# Patient Record
Sex: FEMALE | Race: WHITE | NOT HISPANIC OR LATINO | Employment: UNEMPLOYED | URBAN - NONMETROPOLITAN AREA
[De-identification: names, ages, dates, MRNs, and addresses within clinical notes are randomized per-mention and may not be internally consistent; named-entity substitution may affect disease eponyms.]

---
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
## 2019-06-16 ENCOUNTER — APPOINTMENT (OUTPATIENT)
Dept: GENERAL RADIOLOGY | Facility: HOSPITAL | Age: 19
End: 2019-06-16

## 2019-06-16 ENCOUNTER — HOSPITAL ENCOUNTER (EMERGENCY)
Facility: HOSPITAL | Age: 19
Discharge: HOME | End: 2019-06-16
Attending: FAMILY MEDICINE | Admitting: FAMILY MEDICINE

## 2019-06-16 VITALS
HEART RATE: 105 BPM | DIASTOLIC BLOOD PRESSURE: 97 MMHG | RESPIRATION RATE: 16 BRPM | OXYGEN SATURATION: 99 % | TEMPERATURE: 97.8 F | SYSTOLIC BLOOD PRESSURE: 153 MMHG

## 2019-06-16 DIAGNOSIS — R09.1 PLEURISY: Primary | ICD-10-CM

## 2019-06-16 PROCEDURE — 99282 EMERGENCY DEPT VISIT SF MDM: CPT

## 2019-06-16 PROCEDURE — 71046 X-RAY EXAM CHEST 2 VIEWS: CPT

## 2019-06-16 RX ORDER — PREDNISONE 20 MG/1
20 TABLET ORAL DAILY
Qty: 5 TABLET | Refills: 0 | OUTPATIENT
Start: 2019-06-16 | End: 2021-07-11

## 2021-07-11 ENCOUNTER — APPOINTMENT (OUTPATIENT)
Dept: GENERAL RADIOLOGY | Facility: HOSPITAL | Age: 21
End: 2021-07-11

## 2021-07-11 ENCOUNTER — HOSPITAL ENCOUNTER (EMERGENCY)
Facility: HOSPITAL | Age: 21
Discharge: HOME | End: 2021-07-11
Attending: EMERGENCY MEDICINE | Admitting: EMERGENCY MEDICINE

## 2021-07-11 VITALS
HEART RATE: 96 BPM | SYSTOLIC BLOOD PRESSURE: 172 MMHG | TEMPERATURE: 98.4 F | WEIGHT: 285 LBS | DIASTOLIC BLOOD PRESSURE: 72 MMHG | BODY MASS INDEX: 44.73 KG/M2 | RESPIRATION RATE: 18 BRPM | OXYGEN SATURATION: 96 % | HEIGHT: 67 IN

## 2021-07-11 DIAGNOSIS — S50.12XA CONTUSION OF LEFT FOREARM, INITIAL ENCOUNTER: ICD-10-CM
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
## 2021-07-11 PROCEDURE — 73110 X-RAY EXAM OF WRIST: CPT

## 2021-07-11 PROCEDURE — 99282 EMERGENCY DEPT VISIT SF MDM: CPT

## 2021-07-11 PROCEDURE — 73090 X-RAY EXAM OF FOREARM: CPT

## 2021-07-11 NOTE — ED PROVIDER NOTES
Subjective   20-year-old female presents emergency department with left arm pain after a fall.  Reports she tripped on some boxes and fell landing with her left arm twisted up under her.  Complains of posterior forearm pain.  Denies any other injury, hitting her head or loss of consciousness.  Reports a couple of lacerations to the palmar aspect of her hand which have been cleaned and bandaged by her significant other and she declines evaluation of those at this time.    Family history, surgical history, social history, current medications and allergies are reviewed with the patient and triage documentation and vitals are reviewed.      History provided by:  Patient   used: No        Review of Systems   Musculoskeletal: Positive for arthralgias and joint swelling. Negative for back pain and neck pain.   Skin: Positive for wound.   All other systems reviewed and are negative.      Past Medical History:   Diagnosis Date   • Anxiety and depression    • Depression    • Diabetes (CMS/HCC)    • Disease of thyroid gland     HYPOTHYROID   • Eczema    • GERD (gastroesophageal reflux disease)    • Migraine    • Migraines    • Throat infection     frequent   • Wears glasses        Allergies   Allergen Reactions   • Keflex [Cephalexin] Hives       Past Surgical History:   Procedure Laterality Date   • EAR TUBES Bilateral     AT THE AGE OF 6   • TONSILLECTOMY N/A 6/20/2018    Procedure: TONSILLECTOMY POSSIBLE ADENOIDECTOMY;  Surgeon: Yusuf Espinoza MD;  Location: [facility];  Service: ENT       Family History   Problem Relation Age of Onset   • Hypertension Mother    • Arthritis Mother    • Hypertension Father    • Arthritis Father        Social History     Socioeconomic History   • Marital status: Single     Spouse name: Not on file   • Number of children: Not on file   • Years of education: Not on file   • Highest education level: Not on file   Tobacco Use   • Smoking status: Never Smoker   •  Smokeless tobacco: Never Used   Substance and Sexual Activity   • Alcohol use: No   • Drug use: No   • Sexual activity: Defer           Objective   Physical Exam  Vitals and nursing note reviewed.   Constitutional:       General: She is not in acute distress.     Appearance: Normal appearance. She is obese. She is not ill-appearing, toxic-appearing or diaphoretic.   HENT:      Head: Normocephalic and atraumatic.   Cardiovascular:      Rate and Rhythm: Normal rate and regular rhythm.      Pulses: Normal pulses.      Heart sounds: No murmur heard.     Pulmonary:      Effort: Pulmonary effort is normal.      Breath sounds: Normal breath sounds.   Musculoskeletal:      Left forearm: Swelling and tenderness present. No deformity, lacerations or bony tenderness.      Cervical back: Normal range of motion. No tenderness.   Skin:     General: Skin is warm and dry.      Capillary Refill: Capillary refill takes less than 2 seconds.      Findings: Bruising present.   Neurological:      General: No focal deficit present.      Mental Status: She is alert and oriented to person, place, and time.         Procedures  none         ED Course    Labs Reviewed - No data to display  XR Forearm 2 View Left    Result Date: 7/11/2021  Narrative: XR FOREARM 2 VIEWS INDICATION: 20 years Female; fall, pain Technique: 2 views of the left forearm. COMPARISON: None. FINDINGS: Bones: No acute fracture or subluxation or significant degenerative changes. Soft tissues: Unremarkable. Incidental findings: None.     Impression: NO ACUTE FRACTURE OR SUBLUXATION. Electronically signed by:  Lexus Fisher  7/11/2021 2:45 AM CDT Workstation: [id number]    XR Wrist 3+ View Left    Result Date: 7/11/2021  Narrative: XR WRIST 3 OR MORE VIEWS INDICATION: 20 years Female; fall, pain Technique: 4 views of the left wrist. COMPARISON: 10/22/2015. FINDINGS: Bones: No acute fracture or subluxation or significant degenerative changes. Mild ulnar negative variance. Soft  tissues: Unremarkable. Incidental findings: None.     Impression: NO ACUTE FRACTURE OR SUBLUXATION. Electronically signed by:  Lexus Fisehr  7/11/2021 2:48 AM CDT Workstation: [id number]          MDM  Number of Diagnoses or Management Options     Amount and/or Complexity of Data Reviewed  Tests in the radiology section of CPT®: reviewed    Patient Progress  Patient progress: stable    X-ray of the forearm and wrist revealed no acute osseous abnormality.  Patient advised on symptomatic treatment of contusions and agreeable to discharge.    Final diagnoses:   Accidental fall, initial encounter   Contusion of left forearm, initial encounter       ED Disposition  ED Disposition     ED Disposition Condition Comment    Discharge Stable           Dorian Decker MD  [address] [place] [address]  [phone]               Medication List      Stop    busPIRone 10 MG tablet  Commonly known as: BUSPAR     citalopram 20 MG tablet  Commonly known as: CeleXA     Cool Mist Humidifier misc     HYDROcodone-acetaminophen 7.5-325 MG/15ML solution  Commonly known as: HYCET     omeprazole 20 MG capsule  Commonly known as: priLOSEC     ondansetron ODT 4 MG disintegrating tablet  Commonly known as: Zofran ODT     predniSONE 20 MG tablet  Commonly known as: Avel Craig DO  07/11/21 0645

## 2021-07-11 NOTE — DISCHARGE INSTRUCTIONS
Please return with new or worsening symptoms.  Follow-up with primary care as needed.  Rest, ice, compress and elevate to help with discomfort and swelling.

## 2022-07-29 ENCOUNTER — HOSPITAL ENCOUNTER (EMERGENCY)
Facility: HOSPITAL | Age: 22
Discharge: HOME | End: 2022-07-30
Attending: EMERGENCY MEDICINE | Admitting: EMERGENCY MEDICINE

## 2022-07-29 ENCOUNTER — APPOINTMENT (OUTPATIENT)
Dept: GENERAL RADIOLOGY | Facility: HOSPITAL | Age: 22
End: 2022-07-29

## 2022-07-29 DIAGNOSIS — S60.031A CONTUSION OF RIGHT MIDDLE FINGER WITHOUT DAMAGE TO NAIL, INITIAL ENCOUNTER: Primary | ICD-10-CM

## 2022-07-29 PROCEDURE — 36415 COLL VENOUS BLD VENIPUNCTURE: CPT

## 2022-07-29 PROCEDURE — 99283 EMERGENCY DEPT VISIT LOW MDM: CPT

## 2022-07-29 PROCEDURE — 73140 X-RAY EXAM OF FINGER(S): CPT

## 2022-07-29 RX ORDER — IBUPROFEN 400 MG/1
400 TABLET ORAL ONCE
Status: COMPLETED | OUTPATIENT
Start: 2022-07-29 | End: 2022-07-29

## 2022-07-29 RX ORDER — HYDROCODONE BITARTRATE AND ACETAMINOPHEN 7.5; 325 MG/1; MG/1
1 TABLET ORAL ONCE
Status: COMPLETED | OUTPATIENT
Start: 2022-07-29 | End: 2022-07-29

## 2022-07-29 RX ADMIN — SODIUM CHLORIDE 1000 ML: 9 INJECTION, SOLUTION INTRAVENOUS at 23:40

## 2022-07-29 RX ADMIN — HYDROCODONE BITARTRATE AND ACETAMINOPHEN 1 TABLET: 7.5; 325 TABLET ORAL at 23:40

## 2022-07-29 RX ADMIN — IBUPROFEN 400 MG: 400 TABLET, FILM COATED ORAL at 23:40

## 2022-07-30 VITALS
OXYGEN SATURATION: 97 % | HEIGHT: 67 IN | DIASTOLIC BLOOD PRESSURE: 63 MMHG | TEMPERATURE: 98.6 F | SYSTOLIC BLOOD PRESSURE: 141 MMHG | HEART RATE: 100 BPM | BODY MASS INDEX: 43.95 KG/M2 | RESPIRATION RATE: 18 BRPM | WEIGHT: 280 LBS

## 2022-07-30 RX ORDER — IBUPROFEN 600 MG/1
600 TABLET ORAL 3 TIMES DAILY
Qty: 15 TABLET | Refills: 0 | Status: SHIPPED | OUTPATIENT
Start: 2022-07-30

## 2023-03-31 ENCOUNTER — OFFICE VISIT (OUTPATIENT)
Dept: GASTROENTEROLOGY | Facility: CLINIC | Age: 23
End: 2023-03-31
Payer: MEDICAID

## 2023-03-31 ENCOUNTER — LAB (OUTPATIENT)
Dept: LAB | Facility: HOSPITAL | Age: 23
End: 2023-03-31
Payer: MEDICAID

## 2023-03-31 VITALS
SYSTOLIC BLOOD PRESSURE: 137 MMHG | HEART RATE: 107 BPM | DIASTOLIC BLOOD PRESSURE: 81 MMHG | WEIGHT: 293 LBS | HEIGHT: 67 IN | BODY MASS INDEX: 45.99 KG/M2

## 2023-03-31 DIAGNOSIS — K59.09 OTHER CONSTIPATION: ICD-10-CM

## 2023-03-31 DIAGNOSIS — R19.7 DIARRHEA, UNSPECIFIED TYPE: ICD-10-CM

## 2023-03-31 DIAGNOSIS — R10.10 PAIN OF UPPER ABDOMEN: Primary | ICD-10-CM

## 2023-03-31 DIAGNOSIS — R11.0 NAUSEA: ICD-10-CM

## 2023-03-31 LAB
ALBUMIN SERPL-MCNC: 4.6 G/DL (ref 3.5–5.2)
ALP SERPL-CCNC: 69 U/L (ref 39–117)
ALT SERPL W P-5'-P-CCNC: 73 U/L (ref 1–33)
AST SERPL-CCNC: 50 U/L (ref 1–32)
BILIRUB CONJ SERPL-MCNC: <0.2 MG/DL (ref 0–0.3)
BILIRUB INDIRECT SERPL-MCNC: ABNORMAL MG/DL
BILIRUB SERPL-MCNC: 0.4 MG/DL (ref 0–1.2)
PROT SERPL-MCNC: 8 G/DL (ref 6–8.5)

## 2023-03-31 PROCEDURE — 80076 HEPATIC FUNCTION PANEL: CPT | Performed by: INTERNAL MEDICINE

## 2023-03-31 PROCEDURE — 36415 COLL VENOUS BLD VENIPUNCTURE: CPT | Performed by: INTERNAL MEDICINE

## 2023-03-31 RX ORDER — HYDROXYZINE 50 MG/1
50 TABLET, FILM COATED ORAL 3 TIMES DAILY
COMMUNITY
Start: 2021-05-04

## 2023-03-31 RX ORDER — CLONAZEPAM 0.5 MG/1
0.5 TABLET ORAL 3 TIMES DAILY
COMMUNITY
Start: 2022-04-29

## 2023-03-31 RX ORDER — LURASIDONE HYDROCHLORIDE 60 MG/1
60 TABLET, FILM COATED ORAL NIGHTLY
COMMUNITY
Start: 2023-03-10

## 2023-03-31 RX ORDER — SODIUM CHLORIDE 9 MG/ML
40 INJECTION, SOLUTION INTRAVENOUS AS NEEDED
OUTPATIENT
Start: 2023-03-31

## 2023-03-31 RX ORDER — DICYCLOMINE HCL 20 MG
20 TABLET ORAL EVERY 6 HOURS
Qty: 120 TABLET | Refills: 5 | Status: SHIPPED | OUTPATIENT
Start: 2023-03-31 | End: 2023-04-30

## 2023-03-31 RX ORDER — OMEPRAZOLE 40 MG/1
40 CAPSULE, DELAYED RELEASE ORAL DAILY
Qty: 30 CAPSULE | Refills: 11 | Status: SHIPPED | OUTPATIENT
Start: 2023-03-31

## 2023-03-31 RX ORDER — INSULIN HUMAN 500 [IU]/ML
110 INJECTION, SOLUTION SUBCUTANEOUS 4 TIMES DAILY
COMMUNITY
Start: 2023-03-29

## 2023-03-31 RX ORDER — DEXTROSE AND SODIUM CHLORIDE 5; .45 G/100ML; G/100ML
30 INJECTION, SOLUTION INTRAVENOUS CONTINUOUS PRN
OUTPATIENT
Start: 2023-03-31

## 2023-03-31 RX ORDER — LISINOPRIL 10 MG/1
10 TABLET ORAL DAILY
COMMUNITY
Start: 2023-03-18

## 2023-03-31 RX ORDER — DULAGLUTIDE 3 MG/.5ML
3 INJECTION, SOLUTION SUBCUTANEOUS WEEKLY
COMMUNITY
Start: 2022-04-29 | End: 2023-05-05

## 2023-04-18 NOTE — PROGRESS NOTES
[facility]      Chief Complaint:   Chief Complaint   Patient presents with   • hepatic statosis   • Abdominal Pain   • Constipation   • Diarrhea       Subjective     HPI:   Ms. Briceño is a 22-year-old  female with past medical history of anxiety, depression, diabetes mellitus, thyroid disorder, eczema, migraine, GERD presenting for evaluation for epigastric pain with nausea.  She has intermittent bouts of epigastric pain with nausea and eructations for past several years.  She also has diarrhea alternating with constipation for past several months.  Denied rectal bleeding or weight loss.  She describes her eructations as sulfur burps.  She was seen by Dr. Pierce in the past.  Currently switching care to [place] as she moved here.  She has history of fatty liver disease.  She denied NSAID usage.    Past Medical History:   Past Medical History:   Diagnosis Date   • Anxiety and depression    • Depression    • Diabetes    • Disease of thyroid gland     HYPOTHYROID   • Eczema    • GERD (gastroesophageal reflux disease)    • Migraine    • Migraines    • Throat infection     frequent   • Wears glasses        Past Surgical History:  Past Surgical History:   Procedure Laterality Date   • EAR TUBES Bilateral     AT THE AGE OF 6   • TONSILLECTOMY N/A 6/20/2018    Procedure: TONSILLECTOMY POSSIBLE ADENOIDECTOMY;  Surgeon: Yusuf Espinoza MD;  Location: [facility];  Service: ENT       Family History:  Family History   Problem Relation Age of Onset   • Hypertension Mother    • Arthritis Mother    • Hypertension Father    • Arthritis Father        Social History:   reports that she has never smoked. She has never used smokeless tobacco. She reports that she does not drink alcohol and does not use drugs.    Medications:   Prior to Admission medications    Medication Sig Start Date End Date Taking? Authorizing Provider   clonazePAM (KlonoPIN) 0.5 MG tablet Take 1 tablet by mouth 3  (Three) Times a Day. 4/29/22  Yes Yamileth Zhang MD   Dulaglutide (Trulicity) 3 MG/0.5ML solution pen-injector Inject 3 mg under the skin into the appropriate area as directed 1 (One) Time Per Week. 4/29/22 5/5/23 Yes Yamileth Zhang MD   empagliflozin (JARDIANCE) 25 MG tablet tablet Take 1 tablet by mouth Daily. 3/2/23  Yes Yamileth Zhang MD   fluticasone (VERAMYST) 27.5 MCG/SPRAY nasal spray 2 sprays into the nostril(s) as directed by provider Daily.   Yes Yamileth Zhang MD   HumuLIN R U-500 KwikPen 500 UNIT/ML solution pen-injector CONCENTRATED injection Inject 110 Units under the skin into the appropriate area as directed 4 (Four) Times a Day. 3/29/23  Yes Yamileth Zhang MD   hydrOXYzine (ATARAX) 50 MG tablet Take 1 tablet by mouth 3 (Three) Times a Day. 5/4/21  Yes Yamileth Zhang MD   ibuprofen (ADVIL,MOTRIN) 600 MG tablet Take 1 tablet by mouth 3 (Three) Times a Day. 7/30/22  Yes Eric Arenas,    insulin regular (HumuLIN R,NovoLIN R) 100 UNIT/ML injection Inject 1 Units under the skin into the appropriate area as directed 3 (Three) Times a Day Before Meals. Takes 120 units in am; take 80 units for lunch and supper   Yes ProviderYamileth MD   Latuda 60 MG tablet tablet Take 1 tablet by mouth Every Night. 3/10/23  Yes Yamileth Zhang MD   levothyroxine (SYNTHROID, LEVOTHROID) 25 MCG tablet Take 1 tablet by mouth Daily. No med changes after last level check   Yes Yamileth Zhang MD   lisinopril (PRINIVIL,ZESTRIL) 10 MG tablet Take 1 tablet by mouth Daily. 3/18/23  Yes Yamileth Zhang MD   metFORMIN (GLUCOPHAGE) 1000 MG tablet Take 1 tablet by mouth 2 (Two) Times a Day. 11/29/22 9/5/23 Yes Yamileth Zhang MD   SUMAtriptan (IMITREX) 100 MG tablet Take 1 tablet by mouth Every 2 (Two) Hours As Needed for Migraine.   Yes Yamileth Zhang MD   topiramate (TOPAMAX) 50 MG tablet Take 1 tablet by mouth Daily. 9/2/16  Yes Provider, Historical,  "MD   dicyclomine (BENTYL) 20 MG tablet Take 1 tablet by mouth Every 6 (Six) Hours for 30 days. 3/31/23 4/30/23  Emerita Harper MD   omeprazole (priLOSEC) 40 MG capsule Take 1 capsule by mouth Daily. 3/31/23   Emerita Harper MD       Allergies:  Keflex [cephalexin]    ROS:    Review of Systems   Constitutional: Negative for chills, fatigue, fever and unexpected weight change.   HENT: Negative for congestion, ear discharge, hearing loss, nosebleeds and sore throat.    Eyes: Negative for pain, discharge and redness.   Respiratory: Negative for cough, chest tightness, shortness of breath and wheezing.    Cardiovascular: Negative for chest pain and palpitations.   Gastrointestinal: Positive for abdominal pain, constipation, diarrhea and nausea. Negative for abdominal distention, blood in stool and vomiting.   Endocrine: Negative for cold intolerance, polydipsia, polyphagia and polyuria.   Genitourinary: Negative for dysuria, flank pain, frequency, hematuria and urgency.   Musculoskeletal: Negative for arthralgias, back pain, joint swelling and myalgias.   Skin: Negative for color change, pallor and rash.   Neurological: Negative for tremors, seizures, syncope, weakness and headaches.   Hematological: Negative for adenopathy. Does not bruise/bleed easily.   Psychiatric/Behavioral: Negative for behavioral problems, confusion, dysphoric mood, hallucinations and suicidal ideas. The patient is not nervous/anxious.      Objective     /81 (BP Location: Right arm)   Pulse 107   Ht 170.2 cm (67\")   Wt 134 kg (294 lb 12.8 oz)   BMI 46.17 kg/m²     Physical Exam  Constitutional:       Appearance: She is well-developed.   HENT:      Head: Normocephalic and atraumatic.   Eyes:      Conjunctiva/sclera: Conjunctivae normal.      Pupils: Pupils are equal, round, and reactive to light.   Neck:      Thyroid: No thyromegaly.   Cardiovascular:      Rate and Rhythm: Normal rate and regular rhythm.      Heart sounds: Normal " heart sounds. No murmur heard.  Pulmonary:      Effort: Pulmonary effort is normal.      Breath sounds: Normal breath sounds. No wheezing.   Abdominal:      General: Bowel sounds are normal. There is no distension.      Palpations: Abdomen is soft. There is no mass.      Tenderness: There is no abdominal tenderness.      Hernia: No hernia is present.   Genitourinary:     Comments: No lesions noted  Musculoskeletal:         General: No tenderness. Normal range of motion.      Cervical back: Normal range of motion and neck supple.   Lymphadenopathy:      Cervical: No cervical adenopathy.   Skin:     General: Skin is warm and dry.      Findings: No rash.   Neurological:      Mental Status: She is alert and oriented to person, place, and time.      Cranial Nerves: No cranial nerve deficit.   Psychiatric:         Thought Content: Thought content normal.        Extremities: No edema, cyanosis or clubbing.    Assessment & Plan    1.  Epigastric pain with nausea rule out peptic ulcer disease, gastritis and pancreaticobiliary pathology.  Add Prilosec 40 mg p.o. daily.  Proceed with EGD for further evaluation.  2.  Foul-smelling eructations rule out small intestinal bacterial overgrowth and gastroparesis.  Obtain small intestinal fluid aspirate and gastric emptying scan if EGD is unremarkable for gastric outlet obstruction.  Add Prilosec and proceed with EGD for further evaluation.  Gastric emptying scan if EGD is unremarkable.  3.  Diarrhea alternating with constipation and abdominal pain likely due to IBS.  Need to rule out IBD and celiac sprue.  Obtain C-reactive protein level and proceed with colonoscopy for further evaluation.  Diet high-fiber diet.  Add Bentyl if symptoms continue.  4.  History of fatty liver disease, obtain LFTs today.  Recommend exercise and diet control.  5.  Obesity, recommend exercise and diet control.  Diagnoses and all orders for this visit:    1. Pain of upper abdomen (Primary)  -     Case  Request; Standing  -     sodium chloride 0.9 % infusion 40 mL  -     dextrose 5 % and sodium chloride 0.45 % infusion  -     Case Request  -     Hepatic Function Panel    2. Nausea  -     Case Request; Standing  -     sodium chloride 0.9 % infusion 40 mL  -     dextrose 5 % and sodium chloride 0.45 % infusion  -     Case Request  -     Hepatic Function Panel    3. Diarrhea, unspecified type  -     Case Request; Standing  -     sodium chloride 0.9 % infusion 40 mL  -     dextrose 5 % and sodium chloride 0.45 % infusion  -     Case Request  -     Hepatic Function Panel    4. Other constipation  -     Case Request; Standing  -     sodium chloride 0.9 % infusion 40 mL  -     dextrose 5 % and sodium chloride 0.45 % infusion  -     Case Request  -     Hepatic Function Panel    Other orders  -     Follow Anesthesia Guidelines / Protocol; Future  -     Obtain Informed Consent; Future  -     Implement Anesthesia Orders Day of Procedure; Standing  -     Obtain Informed Consent; Standing  -     POC Glucose Once; Standing  -     Insert Peripheral IV; Standing  -     omeprazole (priLOSEC) 40 MG capsule; Take 1 capsule by mouth Daily.  Dispense: 30 capsule; Refill: 11  -     dicyclomine (BENTYL) 20 MG tablet; Take 1 tablet by mouth Every 6 (Six) Hours for 30 days.  Dispense: 120 tablet; Refill: 5        ESOPHAGOGASTRODUODENOSCOPY (N/A), COLONOSCOPY (N/A)     Diagnosis Plan   1. Pain of upper abdomen  Case Request    sodium chloride 0.9 % infusion 40 mL    dextrose 5 % and sodium chloride 0.45 % infusion    Case Request    Hepatic Function Panel      2. Nausea  Case Request    sodium chloride 0.9 % infusion 40 mL    dextrose 5 % and sodium chloride 0.45 % infusion    Case Request    Hepatic Function Panel      3. Diarrhea, unspecified type  Case Request    sodium chloride 0.9 % infusion 40 mL    dextrose 5 % and sodium chloride 0.45 % infusion    Case Request    Hepatic Function Panel      4. Other constipation  Case Request     sodium chloride 0.9 % infusion 40 mL    dextrose 5 % and sodium chloride 0.45 % infusion    Case Request    Hepatic Function Panel          Anticipated Surgical Procedure:  Orders Placed This Encounter   Procedures   • Hepatic Function Panel     Order Specific Question:   Release to patient     Answer:   Routine Release   • Obtain Informed Consent     Standing Status:   Future     Order Specific Question:   Informed Consent Given For     Answer:   egd and colonoscopy       The risks, benefits, and alternatives of this procedure have been discussed with the patient or the responsible party- the patient understands and agrees to proceed.            This document has been electronically signed by Emerita Harper MD on April 18, 2023 07:25 CDT

## 2023-05-11 ENCOUNTER — HOSPITAL ENCOUNTER (OUTPATIENT)
Facility: HOSPITAL | Age: 23
Setting detail: HOSPITAL OUTPATIENT SURGERY
Discharge: HOME | End: 2023-05-11
Attending: INTERNAL MEDICINE | Admitting: INTERNAL MEDICINE
Payer: MEDICAID

## 2023-05-11 ENCOUNTER — ANESTHESIA EVENT (OUTPATIENT)
Dept: GASTROENTEROLOGY | Facility: HOSPITAL | Age: 23
End: 2023-05-11
Payer: MEDICAID

## 2023-05-11 ENCOUNTER — ANESTHESIA (OUTPATIENT)
Dept: GASTROENTEROLOGY | Facility: HOSPITAL | Age: 23
End: 2023-05-11
Payer: MEDICAID

## 2023-05-11 VITALS
BODY MASS INDEX: 45.99 KG/M2 | OXYGEN SATURATION: 94 % | WEIGHT: 293 LBS | DIASTOLIC BLOOD PRESSURE: 65 MMHG | TEMPERATURE: 97.6 F | HEIGHT: 67 IN | RESPIRATION RATE: 18 BRPM | SYSTOLIC BLOOD PRESSURE: 135 MMHG | HEART RATE: 108 BPM

## 2023-05-11 DIAGNOSIS — K59.09 OTHER CONSTIPATION: ICD-10-CM

## 2023-05-11 DIAGNOSIS — R10.10 PAIN OF UPPER ABDOMEN: ICD-10-CM

## 2023-05-11 DIAGNOSIS — R11.0 NAUSEA: ICD-10-CM

## 2023-05-11 DIAGNOSIS — R19.7 DIARRHEA, UNSPECIFIED TYPE: ICD-10-CM

## 2023-05-11 LAB — GLUCOSE BLDC GLUCOMTR-MCNC: 248 MG/DL (ref 70–130)

## 2023-05-11 PROCEDURE — 43239 EGD BIOPSY SINGLE/MULTIPLE: CPT | Performed by: INTERNAL MEDICINE

## 2023-05-11 PROCEDURE — 45380 COLONOSCOPY AND BIOPSY: CPT | Performed by: INTERNAL MEDICINE

## 2023-05-11 PROCEDURE — 82948 REAGENT STRIP/BLOOD GLUCOSE: CPT

## 2023-05-11 PROCEDURE — 25010000002 PROPOFOL 10 MG/ML EMULSION

## 2023-05-11 PROCEDURE — 87071 CULTURE AEROBIC QUANT OTHER: CPT | Performed by: INTERNAL MEDICINE

## 2023-05-11 PROCEDURE — 45385 COLONOSCOPY W/LESION REMOVAL: CPT | Performed by: INTERNAL MEDICINE

## 2023-05-11 RX ORDER — SODIUM CHLORIDE 9 MG/ML
40 INJECTION, SOLUTION INTRAVENOUS AS NEEDED
Status: DISCONTINUED | OUTPATIENT
Start: 2023-05-11 | End: 2023-05-11 | Stop reason: HOSPADM

## 2023-05-11 RX ORDER — LIDOCAINE HYDROCHLORIDE 20 MG/ML
INJECTION, SOLUTION INTRAVENOUS AS NEEDED
Status: DISCONTINUED | OUTPATIENT
Start: 2023-05-11 | End: 2023-05-11 | Stop reason: SURG

## 2023-05-11 RX ORDER — DEXTROSE AND SODIUM CHLORIDE 5; .45 G/100ML; G/100ML
30 INJECTION, SOLUTION INTRAVENOUS CONTINUOUS PRN
Status: DISCONTINUED | OUTPATIENT
Start: 2023-05-11 | End: 2023-05-11

## 2023-05-11 RX ORDER — PROPOFOL 10 MG/ML
VIAL (ML) INTRAVENOUS AS NEEDED
Status: DISCONTINUED | OUTPATIENT
Start: 2023-05-11 | End: 2023-05-11 | Stop reason: SURG

## 2023-05-11 RX ORDER — SODIUM CHLORIDE 9 MG/ML
50 INJECTION, SOLUTION INTRAVENOUS CONTINUOUS
Status: DISCONTINUED | OUTPATIENT
Start: 2023-05-11 | End: 2023-05-11 | Stop reason: HOSPADM

## 2023-05-11 RX ADMIN — PROPOFOL 20 MG: 10 INJECTION, EMULSION INTRAVENOUS at 11:30

## 2023-05-11 RX ADMIN — PROPOFOL 40 MG: 10 INJECTION, EMULSION INTRAVENOUS at 11:21

## 2023-05-11 RX ADMIN — GLYCOPYRROLATE 0.2 MG: 0.2 INJECTION, SOLUTION INTRAMUSCULAR; INTRAVITREAL at 11:20

## 2023-05-11 RX ADMIN — LIDOCAINE HYDROCHLORIDE 100 MG: 20 INJECTION, SOLUTION INTRAVENOUS at 11:20

## 2023-05-11 RX ADMIN — PROPOFOL 20 MG: 10 INJECTION, EMULSION INTRAVENOUS at 11:24

## 2023-05-11 RX ADMIN — PROPOFOL 20 MG: 10 INJECTION, EMULSION INTRAVENOUS at 11:29

## 2023-05-11 RX ADMIN — PROPOFOL 20 MG: 10 INJECTION, EMULSION INTRAVENOUS at 11:23

## 2023-05-11 RX ADMIN — PROPOFOL 120 MG: 10 INJECTION, EMULSION INTRAVENOUS at 11:20

## 2023-05-11 RX ADMIN — PROPOFOL 20 MG: 10 INJECTION, EMULSION INTRAVENOUS at 11:25

## 2023-05-11 RX ADMIN — SODIUM CHLORIDE 50 ML/HR: 9 INJECTION, SOLUTION INTRAVENOUS at 09:38

## 2023-05-11 RX ADMIN — PROPOFOL 20 MG: 10 INJECTION, EMULSION INTRAVENOUS at 11:27

## 2023-05-11 RX ADMIN — PROPOFOL 20 MG: 10 INJECTION, EMULSION INTRAVENOUS at 11:26

## 2023-05-11 RX ADMIN — PROPOFOL 40 MG: 10 INJECTION, EMULSION INTRAVENOUS at 11:22

## 2023-05-11 NOTE — ANESTHESIA POSTPROCEDURE EVALUATION
Patient: Megan Briceño    Procedure Summary     Date: 05/11/23 Room / Location: [facility] ENDOSCOPY 1 / [facility] ENDOSCOPY    Anesthesia Start: 1117 Anesthesia Stop: 1135    Procedures:       ESOPHAGOGASTRODUODENOSCOPY      COLONOSCOPY Diagnosis:       Pain of upper abdomen      Nausea      Diarrhea, unspecified type      Other constipation      (Pain of upper abdomen [R10.10])      (Nausea [R11.0])      (Diarrhea, unspecified type [R19.7])      (Other constipation [K59.09])    Surgeons: Emerita Harper MD Provider: Lelo North CRNA    Anesthesia Type: general ASA Status: 3          Anesthesia Type: general    Vitals  No vitals data found for the desired time range.          Post Anesthesia Care and Evaluation    Patient location during evaluation: bedside  Patient participation: complete - patient participated  Level of consciousness: awake and alert  Pain management: adequate    Airway patency: patent  Anesthetic complications: No anesthetic complications  PONV Status: none  Cardiovascular status: acceptable  Respiratory status: acceptable  Hydration status: acceptable    Comments: ---------------------------               05/11/23 0922         ---------------------------   BP:          162/90         Pulse:         100          Resp:          18           Temp:   97.9 °F (36.6 °C)   SpO2:         100%         ---------------------------

## 2023-05-11 NOTE — H&P
[facility]      Chief Complaint:   No chief complaint on file.      Subjective     HPI:   Ms. Briceño is a 22-year-old  female with past medical history of anxiety, depression, diabetes mellitus, thyroid disorder, eczema, migraine, GERD presenting for evaluation for epigastric pain with nausea.  She has intermittent bouts of epigastric pain with nausea and eructations for past several years.  She also has diarrhea alternating with constipation for past several months.  Denied rectal bleeding or weight loss.  She describes her eructations as sulfur burps.  She was seen by Dr. Pierce in the past.  Currently switching care to [place] as she moved here.  She has history of fatty liver disease.  She denied NSAID usage.    Past Medical History:   Past Medical History:   Diagnosis Date   • Anxiety and depression    • Depression    • Diabetes    • Disease of thyroid gland     HYPOTHYROID   • Eczema    • GERD (gastroesophageal reflux disease)    • Migraine    • Migraines    • Throat infection     frequent   • Wears glasses        Past Surgical History:    Past Surgical History:   Procedure Laterality Date   • BREAST MASS EXCISION Right 2022   • EAR TUBES Bilateral     AT THE AGE OF 6   • TONSILLECTOMY N/A 06/20/2018    Procedure: TONSILLECTOMY POSSIBLE ADENOIDECTOMY;  Surgeon: Yusuf Espinoza MD;  Location: [facility];  Service: ENT       Family History:  Family History   Problem Relation Age of Onset   • Hypertension Mother    • Arthritis Mother    • Hypertension Father    • Arthritis Father        Social History:   reports that she has never smoked. She has never used smokeless tobacco. She reports that she does not drink alcohol and does not use drugs.    Medications:   Prior to Admission medications    Medication Sig Start Date End Date Taking? Authorizing Provider   clonazePAM (KlonoPIN) 0.5 MG tablet Take 1 tablet by mouth 3 (Three) Times a Day. 4/29/22  Yes Provider,  MD Yamileth   Dulaglutide (Trulicity) 3 MG/0.5ML solution pen-injector Inject 3 mg under the skin into the appropriate area as directed 1 (One) Time Per Week. 4/29/22 5/5/23 Yes Yamileth Zhang MD   empagliflozin (JARDIANCE) 25 MG tablet tablet Take 1 tablet by mouth Daily. 3/2/23  Yes Yamileth Zhang MD   fluticasone (VERAMYST) 27.5 MCG/SPRAY nasal spray 2 sprays into the nostril(s) as directed by provider Daily.   Yes Yamileth Zhang MD   HumuLIN R U-500 KwikPen 500 UNIT/ML solution pen-injector CONCENTRATED injection Inject 110 Units under the skin into the appropriate area as directed 4 (Four) Times a Day. 3/29/23  Yes Yamileth Zhang MD   hydrOXYzine (ATARAX) 50 MG tablet Take 1 tablet by mouth 3 (Three) Times a Day. 5/4/21  Yes Yamileth Zhang MD   ibuprofen (ADVIL,MOTRIN) 600 MG tablet Take 1 tablet by mouth 3 (Three) Times a Day. 7/30/22  Yes Eric Arenas DO   insulin regular (HumuLIN R,NovoLIN R) 100 UNIT/ML injection Inject 1 Units under the skin into the appropriate area as directed 3 (Three) Times a Day Before Meals. Takes 120 units in am; take 80 units for lunch and supper   Yes ProviderYamileth MD   Latuda 60 MG tablet tablet Take 1 tablet by mouth Every Night. 3/10/23  Yes Yamileth Zhang MD   levothyroxine (SYNTHROID, LEVOTHROID) 25 MCG tablet Take 1 tablet by mouth Daily. No med changes after last level check   Yes Yamileth Zhang MD   lisinopril (PRINIVIL,ZESTRIL) 10 MG tablet Take 1 tablet by mouth Daily. 3/18/23  Yes Yamileth Zhang MD   metFORMIN (GLUCOPHAGE) 1000 MG tablet Take 1 tablet by mouth 2 (Two) Times a Day. 11/29/22 9/5/23 Yes Yamileth Zhang MD   SUMAtriptan (IMITREX) 100 MG tablet Take 1 tablet by mouth Every 2 (Two) Hours As Needed for Migraine.   Yes Yamileth Zhang MD   topiramate (TOPAMAX) 50 MG tablet Take 1 tablet by mouth Daily. 9/2/16  Yes Yamileth Zhang MD   dicyclomine (BENTYL) 20 MG tablet Take 1  "tablet by mouth Every 6 (Six) Hours for 30 days. 3/31/23 4/30/23  Emerita Harper MD   omeprazole (priLOSEC) 40 MG capsule Take 1 capsule by mouth Daily. 3/31/23   Emerita Harper MD       Allergies:  Keflex [cephalexin]    ROS:    Review of Systems   Constitutional: Negative for chills, fatigue, fever and unexpected weight change.   HENT: Negative for congestion, ear discharge, hearing loss, nosebleeds and sore throat.    Eyes: Negative for pain, discharge and redness.   Respiratory: Negative for cough, chest tightness, shortness of breath and wheezing.    Cardiovascular: Negative for chest pain and palpitations.   Gastrointestinal: Positive for abdominal pain, constipation, diarrhea and nausea. Negative for abdominal distention, blood in stool and vomiting.   Endocrine: Negative for cold intolerance, polydipsia, polyphagia and polyuria.   Genitourinary: Negative for dysuria, flank pain, frequency, hematuria and urgency.   Musculoskeletal: Negative for arthralgias, back pain, joint swelling and myalgias.   Skin: Negative for color change, pallor and rash.   Neurological: Negative for tremors, seizures, syncope, weakness and headaches.   Hematological: Negative for adenopathy. Does not bruise/bleed easily.   Psychiatric/Behavioral: Negative for behavioral problems, confusion, dysphoric mood, hallucinations and suicidal ideas. The patient is not nervous/anxious.      Objective     /90 (BP Location: Left arm, Patient Position: Lying)   Pulse 100   Temp 97.9 °F (36.6 °C) (Temporal)   Resp 18   Ht 170.2 cm (67\")   Wt 134 kg (296 lb)   LMP 04/20/2023   SpO2 100%   BMI 46.36 kg/m²     Physical Exam  Constitutional:       Appearance: She is well-developed.   HENT:      Head: Normocephalic and atraumatic.   Eyes:      Conjunctiva/sclera: Conjunctivae normal.      Pupils: Pupils are equal, round, and reactive to light.   Neck:      Thyroid: No thyromegaly.   Cardiovascular:      Rate and Rhythm: Normal " rate and regular rhythm.      Heart sounds: Normal heart sounds. No murmur heard.  Pulmonary:      Effort: Pulmonary effort is normal.      Breath sounds: Normal breath sounds. No wheezing.   Abdominal:      General: Bowel sounds are normal. There is no distension.      Palpations: Abdomen is soft. There is no mass.      Tenderness: There is no abdominal tenderness.      Hernia: No hernia is present.   Genitourinary:     Comments: No lesions noted  Musculoskeletal:         General: No tenderness. Normal range of motion.      Cervical back: Normal range of motion and neck supple.   Lymphadenopathy:      Cervical: No cervical adenopathy.   Skin:     General: Skin is warm and dry.      Findings: No rash.   Neurological:      Mental Status: She is alert and oriented to person, place, and time.      Cranial Nerves: No cranial nerve deficit.   Psychiatric:         Thought Content: Thought content normal.        Extremities: No edema, cyanosis or clubbing.    Assessment & Plan    1.  Epigastric pain with nausea rule out peptic ulcer disease, gastritis and pancreaticobiliary pathology.  Add Prilosec 40 mg p.o. daily.  Proceed with EGD for further evaluation.  2.  Foul-smelling eructations rule out small intestinal bacterial overgrowth and gastroparesis.  Obtain small intestinal fluid aspirate and gastric emptying scan if EGD is unremarkable for gastric outlet obstruction.  Add Prilosec and proceed with EGD for further evaluation.  Gastric emptying scan if EGD is unremarkable.  3.  Diarrhea alternating with constipation and abdominal pain likely due to IBS.  Need to rule out IBD and celiac sprue.  Obtain C-reactive protein level and proceed with colonoscopy for further evaluation.  Diet high-fiber diet.  Add Bentyl if symptoms continue.  4.  History of fatty liver disease, obtain LFTs today.  Recommend exercise and diet control.  5.  Obesity, recommend exercise and diet control.  Diagnoses and all orders for this  visit:    1. Other constipation  -     sodium chloride 0.9 % infusion 40 mL  -     Discontinue: dextrose 5 % and sodium chloride 0.45 % infusion    2. Nausea  -     sodium chloride 0.9 % infusion 40 mL  -     Discontinue: dextrose 5 % and sodium chloride 0.45 % infusion    3. Pain of upper abdomen  -     sodium chloride 0.9 % infusion 40 mL  -     Discontinue: dextrose 5 % and sodium chloride 0.45 % infusion    4. Diarrhea, unspecified type  -     sodium chloride 0.9 % infusion 40 mL  -     Discontinue: dextrose 5 % and sodium chloride 0.45 % infusion    Other orders  -     Implement Anesthesia Orders Day of Procedure; Standing  -     Obtain Informed Consent; Standing  -     POC Glucose Once; Standing  -     Insert Peripheral IV; Standing  -     Implement Anesthesia Orders Day of Procedure  -     Obtain Informed Consent  -     POC Glucose Once  -     Insert Peripheral IV  -     sodium chloride 0.9 % infusion  -     POC Glucose Once; Standing  -     POC Glucose Once        ESOPHAGOGASTRODUODENOSCOPY (N/A), COLONOSCOPY (N/A)     Diagnosis Plan   1. Other constipation  sodium chloride 0.9 % infusion 40 mL      2. Nausea  sodium chloride 0.9 % infusion 40 mL      3. Pain of upper abdomen  sodium chloride 0.9 % infusion 40 mL      4. Diarrhea, unspecified type  sodium chloride 0.9 % infusion 40 mL          Anticipated Surgical Procedure:  Orders Placed This Encounter   Procedures   • Implement Anesthesia Orders Day of Procedure     Standing Status:   Standing     Number of Occurrences:   1   • Obtain Informed Consent     Standing Status:   Standing     Number of Occurrences:   1     Order Specific Question:   Informed Consent Given For     Answer:   egd and colonoscopy   • POC Glucose Once     Prior to Procedure on ALL Diabetic Patients     Standing Status:   Standing     Number of Occurrences:   1   • POC Glucose Once     Standing Status:   Standing     Number of Occurrences:   1     Order Specific Question:   Release  to patient     Answer:   Routine Release   • Insert Peripheral IV     Standing Status:   Standing     Number of Occurrences:   1       The risks, benefits, and alternatives of this procedure have been discussed with the patient or the responsible party- the patient understands and agrees to proceed.            This document has been electronically signed by Emerita Harper MD on May 11, 2023 10:43 CDT

## 2023-05-11 NOTE — ANESTHESIA PREPROCEDURE EVALUATION
Anesthesia Evaluation     Patient summary reviewed and Nursing notes reviewed   no history of anesthetic complications:  NPO Solid Status: > 8 hours  NPO Liquid Status: > 2 hours           Airway   Mallampati: II  TM distance: >3 FB  Neck ROM: limited  Possible difficult intubation  Dental    (+) poor dentition    Pulmonary     breath sounds clear to auscultation  (+) a smoker (family smokes),     ROS comment: Chronic tonsillitis  Cardiovascular - negative cardio ROS  Exercise tolerance: good (4-7 METS)    ECG reviewed  Rhythm: regular  Rate: normal      ROS comment: Normal sinus rhythm  Normal ECG  When compared with ECG of 04-SEP-2017 22:14,  Questionable change in QRS axis  Confirmed by EDER     Neuro/Psych  (+) headaches (migraines twice a month), psychiatric history Anxiety and Depression,    GI/Hepatic/Renal/Endo    (+) obesity, morbid obesity, GERD well controlled,  diabetes mellitus (glu 131) type 2 well controlled, thyroid problem hypothyroidism    ROS Comment: Dulaglutide (Trulicity)    Musculoskeletal     (+) back pain,   Abdominal   (+) obese,    Substance History      OB/GYN negative ob/gyn ROS   (-)  Pregnant        Other - negative ROS                         Anesthesia Plan    ASA 3     general   total IV anesthesia  intravenous induction     Anesthetic plan, risks, benefits, and alternatives have been provided, discussed and informed consent has been obtained with: patient.    Plan discussed with CRNA.

## 2023-05-12 LAB — REF LAB TEST METHOD: NORMAL

## 2023-05-13 LAB — BACTERIA SPEC AEROBE CULT: NORMAL

## 2023-06-03 ENCOUNTER — HOSPITAL ENCOUNTER (EMERGENCY)
Facility: HOSPITAL | Age: 23
Discharge: HOME | End: 2023-06-03
Attending: STUDENT IN AN ORGANIZED HEALTH CARE EDUCATION/TRAINING PROGRAM
Payer: MEDICAID

## 2023-06-03 VITALS
RESPIRATION RATE: 20 BRPM | HEIGHT: 67 IN | DIASTOLIC BLOOD PRESSURE: 94 MMHG | WEIGHT: 293 LBS | OXYGEN SATURATION: 100 % | SYSTOLIC BLOOD PRESSURE: 147 MMHG | TEMPERATURE: 97 F | BODY MASS INDEX: 45.99 KG/M2 | HEART RATE: 120 BPM

## 2023-06-03 DIAGNOSIS — Z77.098 ACCIDENTAL EXPOSURE TO BLEACH: Primary | ICD-10-CM

## 2023-06-03 DIAGNOSIS — S05.00XA CORNEAL ABRASION, UNSPECIFIED LATERALITY, INITIAL ENCOUNTER: ICD-10-CM

## 2023-06-03 PROCEDURE — 99283 EMERGENCY DEPT VISIT LOW MDM: CPT

## 2023-06-03 RX ORDER — ERYTHROMYCIN 5 MG/G
1 OINTMENT OPHTHALMIC ONCE
Status: DISCONTINUED | OUTPATIENT
Start: 2023-06-03 | End: 2023-06-04 | Stop reason: HOSPADM

## 2023-06-03 RX ORDER — PROPARACAINE HYDROCHLORIDE 5 MG/ML
2 SOLUTION/ DROPS OPHTHALMIC ONCE
Status: COMPLETED | OUTPATIENT
Start: 2023-06-03 | End: 2023-06-03

## 2023-06-03 RX ORDER — DEXTROSE, SODIUM CHLORIDE, SODIUM LACTATE, POTASSIUM CHLORIDE, AND CALCIUM CHLORIDE 5; .6; .31; .03; .02 G/100ML; G/100ML; G/100ML; G/100ML; G/100ML
1000 INJECTION, SOLUTION INTRAVENOUS ONCE
Status: DISCONTINUED | OUTPATIENT
Start: 2023-06-03 | End: 2023-06-03

## 2023-06-03 RX ORDER — ERYTHROMYCIN 5 MG/G
OINTMENT OPHTHALMIC
Qty: 3.5 G | Refills: 0 | Status: SHIPPED | OUTPATIENT
Start: 2023-06-03

## 2023-06-03 RX ADMIN — PROPARACAINE HYDROCHLORIDE 2 DROP: 5 SOLUTION/ DROPS OPHTHALMIC at 20:56

## 2023-06-04 NOTE — ED PROVIDER NOTES
Subjective   History of Present Illness  22-year-old female was grabbing a bottle of bleach when she tripped and fell.  When she felt the lid popped off of the bleach and it squirted her in the eyes bilaterally.  She irrigated for about 20 minutes before coming to the ER.  Patient still has bilateral eye burning, blurry vision, sensitivity to light.  She denies other symptoms.    History provided by:  Patient and spouse   used: No      Review of Systems   Constitutional:  Negative for chills and fever.   Eyes:  Positive for photophobia, pain, redness and visual disturbance. Negative for discharge and itching.     Past Medical History:   Diagnosis Date    Anxiety and depression     Depression     Diabetes     Disease of thyroid gland     HYPOTHYROID    Eczema     GERD (gastroesophageal reflux disease)     Migraine     Migraines     Throat infection     frequent    Wears glasses        Allergies   Allergen Reactions    Keflex [Cephalexin] Hives       Past Surgical History:   Procedure Laterality Date    BREAST MASS EXCISION Right 2022    COLONOSCOPY N/A 5/11/2023    Procedure: COLONOSCOPY;  Surgeon: Emerita Harper MD;  Location: [facility] ENDOSCOPY;  Service: Gastroenterology;  Laterality: N/A;    EAR TUBES Bilateral     AT THE AGE OF 6    ENDOSCOPY N/A 5/11/2023    Procedure: ESOPHAGOGASTRODUODENOSCOPY;  Surgeon: Emerita Harper MD;  Location: [facility] ENDOSCOPY;  Service: Gastroenterology;  Laterality: N/A;    TONSILLECTOMY N/A 06/20/2018    Procedure: TONSILLECTOMY POSSIBLE ADENOIDECTOMY;  Surgeon: Yusuf Espinoza MD;  Location: [facility] OR;  Service: ENT       Family History   Problem Relation Age of Onset    Hypertension Mother     Arthritis Mother     Hypertension Father     Arthritis Father        Social History     Socioeconomic History    Marital status:    Tobacco Use    Smoking status: Never    Smokeless tobacco: Never   Vaping Use    Vaping Use: Never used   Substance and  "Sexual Activity    Alcohol use: No    Drug use: No    Sexual activity: Defer           Objective   Vitals:    06/03/23 2024   BP: 147/94   BP Location: Right arm   Patient Position: Sitting   Pulse: 120   Resp: 20   Temp: 97 °F (36.1 °C)   TempSrc: Oral   SpO2: 100%   Weight: 134 kg (296 lb)   Height: 170.2 cm (67\")       Physical Exam  Vitals and nursing note reviewed.   Constitutional:       General: She is not in acute distress.     Appearance: She is well-developed. She is obese. She is not ill-appearing or diaphoretic.   HENT:      Head: Normocephalic.   Eyes:      General: Lids are normal. Vision grossly intact.         Right eye: No foreign body or discharge.         Left eye: No foreign body or discharge.      Extraocular Movements: Extraocular movements intact.      Conjunctiva/sclera: Conjunctivae normal.      Right eye: Chemosis present. No exudate.     Left eye: Chemosis present. No exudate.     Pupils: Pupils are equal, round, and reactive to light. Pupils are equal.      Right eye: Corneal abrasion present. Arielle exam negative.      Left eye: Corneal abrasion present. Arielle exam negative.  Pulmonary:      Effort: Pulmonary effort is normal. No accessory muscle usage or respiratory distress.   Skin:     General: Skin is warm and dry.   Neurological:      Mental Status: She is oriented to person, place, and time.       Procedures           ED Course                                           Medical Decision Making  Vital signs are stable, afebrile.  Patient's eyes were irrigated with Dusty lens with 2 L of LR.  Patient after states that her vision, pain is better.  She is bilateral abrasions on fluorescein and Woods lamp's exam.  No Arielle sign noted.  Spoke with [facility] ophthalmology on-call for follow-up.  Patient prescribed erythromycin eye ointment.  Return precautions given.  Patient and family state understanding and are agreeable to the plan.    Risk  Prescription drug " management.        Final diagnoses:   Accidental exposure to bleach   Corneal abrasion, unspecified laterality, initial encounter       ED Disposition  ED Disposition       ED Disposition   Discharge    Condition   Stable    Comment   --               Eric Cervantes MD  [address] [facility] [address] [phone]    Schedule an appointment as soon as possible for a visit in 2 days  ER follow up    Dr. Alicea  [phone]  Call in 2 days  ER follow up         Medication List        New Prescriptions      erythromycin 5 MG/GM ophthalmic ointment  Commonly known as: ROMYCIN  Administer  to both eyes Every 4 (Four) Hours While Awake.               Where to Get Your Medications        These medications were sent to [facility] [address] [place] [address] [address] [facility] - [phone]  - [phone] [facility] [address] [facility] [address]      Phone: [phone]   erythromycin 5 MG/GM ophthalmic ointment            Jose Pinto MD  06/03/23 4266

## 2023-07-18 ENCOUNTER — OFFICE VISIT (OUTPATIENT)
Dept: GASTROENTEROLOGY | Facility: CLINIC | Age: 23
End: 2023-07-18
Payer: MEDICAID

## 2023-07-18 VITALS
SYSTOLIC BLOOD PRESSURE: 131 MMHG | HEART RATE: 100 BPM | HEIGHT: 67 IN | BODY MASS INDEX: 45.99 KG/M2 | DIASTOLIC BLOOD PRESSURE: 91 MMHG | WEIGHT: 293 LBS

## 2023-07-18 DIAGNOSIS — R10.10 PAIN OF UPPER ABDOMEN: Primary | ICD-10-CM

## 2023-07-18 RX ORDER — TIRZEPATIDE 7.5 MG/.5ML
7.5 INJECTION, SOLUTION SUBCUTANEOUS WEEKLY
COMMUNITY
Start: 2023-07-09

## 2023-07-18 RX ORDER — DICYCLOMINE HCL 20 MG
20 TABLET ORAL EVERY 6 HOURS
COMMUNITY
Start: 2023-07-11

## 2023-07-18 RX ORDER — SUCRALFATE 1 G/1
1 TABLET ORAL 4 TIMES DAILY
COMMUNITY
Start: 2023-06-09

## 2023-07-24 ENCOUNTER — HOSPITAL ENCOUNTER (OUTPATIENT)
Dept: ULTRASOUND IMAGING | Facility: HOSPITAL | Age: 23
Discharge: HOME | End: 2023-07-24
Admitting: INTERNAL MEDICINE
Payer: MEDICAID

## 2023-07-24 DIAGNOSIS — R10.10 PAIN OF UPPER ABDOMEN: ICD-10-CM

## 2023-07-24 PROCEDURE — 76705 ECHO EXAM OF ABDOMEN: CPT

## 2023-07-27 NOTE — PROGRESS NOTES
Chief Complaint   Patient presents with    Constipation     1 month f/u     Abdominal Pain    Diarrhea       Subjective    Megan Briceño is a 23 y.o. female.    History of Present Illness  Patient presented to GI clinic for follow-up visit today.  Has intermittent symptoms of abdominal pain with diarrhea alternating with constipation and nausea.  Denied vomiting, rectal bleeding or weight loss.  Taking Prilosec, Carafate and Bentyl daily.  Denied NSAID usage.       The following portions of the patient's history were reviewed and updated as appropriate:   Past Medical History:   Diagnosis Date    Anxiety and depression     Depression     Diabetes     Disease of thyroid gland     HYPOTHYROID    Eczema     GERD (gastroesophageal reflux disease)     Migraine     Migraines     Throat infection     frequent    Wears glasses      Past Surgical History:   Procedure Laterality Date    BREAST MASS EXCISION Right 2022    COLONOSCOPY N/A 5/11/2023    Procedure: COLONOSCOPY;  Surgeon: Emerita Harper MD;  Location: [facility] ENDOSCOPY;  Service: Gastroenterology;  Laterality: N/A;    EAR TUBES Bilateral     AT THE AGE OF 6    ENDOSCOPY N/A 5/11/2023    Procedure: ESOPHAGOGASTRODUODENOSCOPY;  Surgeon: Emerita Harper MD;  Location: [facility] ENDOSCOPY;  Service: Gastroenterology;  Laterality: N/A;    TONSILLECTOMY N/A 06/20/2018    Procedure: TONSILLECTOMY POSSIBLE ADENOIDECTOMY;  Surgeon: Yusuf Espinoza MD;  Location: [facility] OR;  Service: ENT     Family History   Problem Relation Age of Onset    Hypertension Mother     Arthritis Mother     Hypertension Father     Arthritis Father      OB History    No obstetric history on file.       Prior to Admission medications    Medication Sig Start Date End Date Taking? Authorizing Provider   clonazePAM (KlonoPIN) 0.5 MG tablet Take 1 tablet by mouth 3 (Three) Times a Day. 4/29/22  Yes Provider, MD Yamileth   dicyclomine (BENTYL) 20 MG tablet Take 1 tablet by mouth Every  6 (Six) Hours. 7/11/23  Yes Yamileth Zhang MD   empagliflozin (JARDIANCE) 25 MG tablet tablet Take 1 tablet by mouth Daily. 3/2/23  Yes Yamileth Zhang MD   erythromycin (ROMYCIN) 5 MG/GM ophthalmic ointment Administer  to both eyes Every 4 (Four) Hours While Awake. 6/3/23  Yes Jose Pinto MD   fluticasone (VERAMYST) 27.5 MCG/SPRAY nasal spray 2 sprays into the nostril(s) as directed by provider Daily.   Yes Yamileth Zhang MD   HumuLIN R U-500 KwikPen 500 UNIT/ML solution pen-injector CONCENTRATED injection Inject 110 Units under the skin into the appropriate area as directed 4 (Four) Times a Day. 3/29/23  Yes Yamileth Zhang MD   hydrOXYzine (ATARAX) 50 MG tablet Take 1 tablet by mouth 3 (Three) Times a Day. 5/4/21  Yes Yamileth Zhang MD   ibuprofen (ADVIL,MOTRIN) 600 MG tablet Take 1 tablet by mouth 3 (Three) Times a Day. 7/30/22  Yes Eric Arenas DO   Latuda 60 MG tablet tablet Take 1 tablet by mouth Every Night. 3/10/23  Yes Yamileth Zhang MD   levothyroxine (SYNTHROID, LEVOTHROID) 25 MCG tablet Take 1 tablet by mouth Daily. No med changes after last level check   Yes Yamileth Zhang MD   lisinopril (PRINIVIL,ZESTRIL) 10 MG tablet Take 1 tablet by mouth Daily. 3/18/23  Yes Yamileth Zhang MD   metFORMIN (GLUCOPHAGE) 1000 MG tablet Take 1 tablet by mouth 2 (Two) Times a Day. 11/29/22 9/5/23 Yes Provider, Historical, MD   Mounjaro 7.5 MG/0.5ML solution pen-injector Inject 7.5 mg under the skin into the appropriate area as directed 1 (One) Time Per Week. 7/9/23  Yes Yamileth Zhang MD   omeprazole (priLOSEC) 40 MG capsule Take 1 capsule by mouth Daily. 3/31/23  Yes Emerita Harper MD   sucralfate (CARAFATE) 1 g tablet Take 1 tablet by mouth 4 (Four) Times a Day. 6/9/23  Yes Yamilteh Zhang MD   SUMAtriptan (IMITREX) 100 MG tablet Take 1 tablet by mouth Every 2 (Two) Hours As Needed for Migraine.   Yes Provider, MD Yamileth   topiramate  "(TOPAMAX) 50 MG tablet Take 1 tablet by mouth Daily. 9/2/16  Yes Provider, Historical, MD     Allergies   Allergen Reactions    Keflex [Cephalexin] Hives     Social History     Socioeconomic History    Marital status:    Tobacco Use    Smoking status: Never    Smokeless tobacco: Never   Vaping Use    Vaping Use: Never used   Substance and Sexual Activity    Alcohol use: No    Drug use: No    Sexual activity: Defer       Review of Systems  Review of Systems   Constitutional:  Negative for chills, fatigue, fever and unexpected weight change.   HENT:  Negative for congestion, ear discharge, hearing loss, nosebleeds and sore throat.    Eyes:  Negative for pain, discharge and redness.   Respiratory:  Negative for cough, chest tightness, shortness of breath and wheezing.    Cardiovascular:  Negative for chest pain and palpitations.   Gastrointestinal:  Positive for abdominal pain, constipation, diarrhea and nausea. Negative for abdominal distention, blood in stool and vomiting.   Endocrine: Negative for cold intolerance, polydipsia, polyphagia and polyuria.   Genitourinary:  Negative for dysuria, flank pain, frequency, hematuria and urgency.   Musculoskeletal:  Negative for arthralgias, back pain, joint swelling and myalgias.   Skin:  Negative for color change, pallor and rash.   Neurological:  Negative for tremors, seizures, syncope, weakness and headaches.   Hematological:  Negative for adenopathy. Does not bruise/bleed easily.   Psychiatric/Behavioral:  Negative for behavioral problems, confusion, dysphoric mood, hallucinations and suicidal ideas. The patient is not nervous/anxious.       /91 (BP Location: Right arm)   Pulse 100   Ht 170.2 cm (67\")   Wt (!) 138 kg (303 lb 12.8 oz)   BMI 47.58 kg/m²     Objective    Physical Exam  Constitutional:       Appearance: She is well-developed.   HENT:      Head: Normocephalic and atraumatic.   Eyes:      Conjunctiva/sclera: Conjunctivae normal.      Pupils: " Pupils are equal, round, and reactive to light.   Neck:      Thyroid: No thyromegaly.   Cardiovascular:      Rate and Rhythm: Normal rate and regular rhythm.      Heart sounds: Normal heart sounds. No murmur heard.  Pulmonary:      Effort: Pulmonary effort is normal.      Breath sounds: Normal breath sounds. No wheezing.   Abdominal:      General: Bowel sounds are normal. There is no distension.      Palpations: Abdomen is soft. There is no mass.      Tenderness: There is no abdominal tenderness.      Hernia: No hernia is present.   Genitourinary:     Comments: No lesions noted  Musculoskeletal:         General: No tenderness. Normal range of motion.      Cervical back: Normal range of motion and neck supple.   Lymphadenopathy:      Cervical: No cervical adenopathy.   Skin:     General: Skin is warm and dry.      Findings: No rash.   Neurological:      Mental Status: She is alert and oriented to person, place, and time.      Cranial Nerves: No cranial nerve deficit.   Psychiatric:         Thought Content: Thought content normal.     Admission on 2023, Discharged on 2023   Component Date Value Ref Range Status    Glucose 2023 248 (H)  70 - 130 mg/dL Final    RN NotifiedOperator: [id number] IVAN KRISTIMeter ID: [id number]    Duodenal Culture 2023 No growth at 2 days   Final    Reference Lab Report 2023    Final                    Value:[facility]  [address] [place] [address]  Phone: [phone] or [phone]  Fax: [phone]  Akira Encarnacion M.D., Medical Director    PATIENT NAME                           LABORATORY NO.  GALLO LOPEZ.                  [id number]  [id number]                         AGE              SEX  SSN           CLIENT REF #  [facility]           2000  F    [id number]   [id number]    [place]                       REQUESTING M.D.     ATTENDING MАННА.     COPY TO.  [address]  "[facility]                 [place] [address]             SUMALATHA  DATE COLLECTED      DATE RECEIVED      DATE REPORTED  05/11/2023 05/11/2023 05/12/2023    DIAGNOSIS:  A.   DUODENUM, BIOPSY:  No significant histologic abnormality  B.   ANTRUM, BIOPSY:  Few lymphoid follicles  C.   GE JUNCTION:  Reactive gastric mucosa and scant reactive squamous mucosa  Negative for specialized                           Romero's mucosa  D.   TERMINAL ILEUM BIOPSY:  No significant histologic abnormality  E.   COLON, BIOPSY:  No significant histologic abnormality  F.   SIGMOID COLON POLYP:  Hyperplastic polyp    JBS/pah    CLINICAL HISTORY:  Pain of upper abdomen, nausea, diarrhea, other constipation    SPECIMENS RECEIVED:  A.  DUODENUM, BIOPSY  B.  ANTRUM, BIOPSY  C.  GE JUNCTION  D.  TERMINAL ILEUM BIOPSY  E.  COLON, BIOPSY  F.  SIGMOID COLON POLYP    MICROSCOPIC DESCRIPTION:  Tissue blocks are prepared and slides are examined microscopically on all  specimens. See diagnosis for details.    Professional interpretation rendered by Baldev Beltrán M.D. at [employer],  [place], [address] [place] [address].    GROSS DESCRIPTION:  A.  Labeled as \"duodenum\", consisting of 1 piece of tan soft tissue measuring  0.5 x 0.2 x 0.2 cm, submitted entirely in 1 cassette.  SOG  B.  Labeled as \"gastric antrum\", consisting of 2 pieces of tan soft tissue  measuring 0.4 x 0.2 x 0.2 cm, submitted entirely in                           1 cassette.  C.  Labeled as \"GE junction\", consisting of 1 piece of tan soft tissue measuring  0.3 x 0.2 x 0.2 cm, submitted entirely in 1 cassette.  D.  Labeled as \"terminal ileum\", consisting of 2 pieces of tan soft tissue  measuring 0.4 x 0.3 x 0.2 cm, submitted entirely cassette.  E.  Labeled as \"colonic mucosa\", consisting of 2 pieces of tan soft tissue  measuring 0.8 x 0.4 x 0.2 cm, submitted entirely in 1 cassette.  F.  Labeled as \"sigmoid " "colon polyp\", consisting of 1 piece of tan soft tissue  measuring 0.6 x 0.3 x 0.2 cm, submitted entirely in 1 cassette.    REVIEWED, DIAGNOSED AND ELECTRONICALLY  SIGNED BY:    Baldev Beltrán M.D.  CPT CODES:  88305x6       Assessment & Plan      1. Pain of upper abdomen    1.  Epigastric pain with nausea likely due to gastritis.  Need to rule out pancreaticobiliary pathology.  Continue Prilosec and Carafate.  Obtain right upper quadrant sonogram for further evaluation. Change diet to frequent small meals.  2.  Abdominal pain with diarrhea alternating with constipation, likely due to IBS.  Continue Bentyl, high-fiber diet and probiotics.  3.  Obesity, recommend exercise and diet control.  4.  Fatty liver disease, repeat LFTs in next visit.  Recommend exercise and diet control.       Orders placed during this encounter include:  Orders Placed This Encounter   Procedures    US Gallbladder     Standing Status:   Future     Number of Occurrences:   1     Standing Expiration Date:   7/18/2024     Order Specific Question:   Reason for Exam:     Answer:   RUQ PAIN       * Surgery not found *    Review and/or summary of lab tests, radiology, procedures, medications. Review and summary of old records and obtaining of history. The risks and benefits of my recommendations, as well as other treatment options were discussed with the patient today. Questions were answered.    No orders of the defined types were placed in this encounter.      Follow-up: No follow-ups on file.               Results for orders placed or performed during the hospital encounter of 05/11/23   TISSUE EXAM, [facility] (ARJUN,COR,MAD)    Specimen: A: Small Intestine, Duodenum; Tissue    B: Gastric, Antrum; Tissue    C: Esophagus, Distal; Tissue    D: Small Intestine, Ileum; Tissue    E: Large Intestine; Tissue    F: Large Intestine, Sigmoid Colon; Tissue   Result Value Ref Range    Reference Lab Report       [facility]  [address] " "[place] [address]  Phone: [phone] or [phone]  Fax: [phone]  Akira Encarnacion M.D., Medical Director    PATIENT NAME                           LABORATORY NO.  GALLO LOPEZ.                  [id number]  [id number]                         AGE              SEX  SSN           CLIENT REF #  [facility]           22      2000  F    [id number]   [id number]    [place]                       REQUESTING M.D.     ATTENDING M.D.     COPY TO.  [address] [place] [address]                 [address]  DATE COLLECTED      DATE RECEIVED      DATE REPORTED  2023    DIAGNOSIS:  A.   DUODENUM, BIOPSY:  No significant histologic abnormality  B.   ANTRUM, BIOPSY:  Few lymphoid follicles  C.   GE JUNCTION:  Reactive gastric mucosa and scant reactive squamous mucosa  Negative for specialized  Romero's mucosa  D.   TERMINAL ILEUM BIOPSY:  No significant histologic abnormality  E.   COLON, BIOPSY:  No significant histologic abnormality  F.   SIGMOID COLON POLYP:  Hyperplastic polyp    JBS/pah    CLINICAL HISTORY:  Pain of upper abdomen, nausea, diarrhea, other constipation    SPECIMENS RECEIVED:  A.  DUODENUM, BIOPSY  B.  ANTRUM, BIOPSY  C.  GE JUNCTION  D.  TERMINAL ILEUM BIOPSY  E.  COLON, BIOPSY  F.  SIGMOID COLON POLYP    MICROSCOPIC DESCRIPTION:  Tissue blocks are prepared and slides are examined microscopically on all  specimens. See diagnosis for details.    Professional interpretation rendered by Baldev Beltrán M.D. at [employer],  [facility], [address] [place] [address].    GROSS DESCRIPTION:  A.  Labeled as \"duodenum\", consisting of 1 piece of tan soft tissue measuring  0.5 x 0.2 x 0.2 cm, submitted entirely in 1 cassette.  SOG  B.  Labeled as \"gastric antrum\", consisting of 2 pieces of tan soft tissue  measuring 0.4 x 0.2 x 0.2 cm, submitted entirely " "in  1 cassette.  C.  Labeled as \"GE junction\", consisting of 1 piece of tan soft tissue measuring  0.3 x 0.2 x 0.2 cm, submitted entirely in 1 cassette.  D.  Labeled as \"terminal ileum\", consisting of 2 pieces of tan soft tissue  measuring 0.4 x 0.3 x 0.2 cm, submitted entirely cassette.  E.  Labeled as \"colonic mucosa\", consisting of 2 pieces of tan soft tissue  measuring 0.8 x 0.4 x 0.2 cm, submitted entirely in 1 cassette.  F.  Labeled as \"sigmoid colon polyp\", consisting of 1 piece of tan soft tissue  measuring 0.6 x 0.3 x 0.2 cm, submitted entirely in 1 cassette.    REVIEWED, DIAGNOSED AND ELECTRONICALLY  SIGNED BY:    Baldev Beltrán M.D.  CPT CODES:  88305x6     POC Glucose Once    Specimen: Blood   Result Value Ref Range    Glucose 248 (H) 70 - 130 mg/dL   Duodenal Culture, Quantitative - Body Fluid, Small Intestine, Duodenum    Specimen: Small Intestine, Duodenum; Body Fluid   Result Value Ref Range    Duodenal Culture No growth at 2 days    Results for orders placed or performed in visit on 03/31/23   Hepatic Function Panel    Specimen: Blood   Result Value Ref Range    Total Protein 8.0 6.0 - 8.5 g/dL    Albumin 4.6 3.5 - 5.2 g/dL    ALT (SGPT) 73 (H) 1 - 33 U/L    AST (SGOT) 50 (H) 1 - 32 U/L    Alkaline Phosphatase 69 39 - 117 U/L    Total Bilirubin 0.4 0.0 - 1.2 mg/dL    Bilirubin, Direct <0.2 0.0 - 0.3 mg/dL    Bilirubin, Indirect     Results for orders placed or performed during the hospital encounter of 06/20/18   Tissue Pathology Exam    Specimen: Tonsils; Tissue   Result Value Ref Range    Case Report       Surgical Pathology Report                         Case: [id number]                                  Authorizing Provider:  Yusuf Espinoza MD Collected:           06/20/2018 09:35 AM          Ordering Location:     [facility]             Received:            06/20/2018 10:07 AM                                 [place] OR                                                          "     Pathologist:           Wayne Aguilera MD                                                         Specimen:    Tonsils, right tagged                                                                      Final Diagnosis       CHRONIC TONSILLITIS (2).      Gross Description       The specimen consists of 2 pieces of tonsils almost equal in size and shape, one measuring 3.0 x 2.5 x 2.5 cm.  Their surfaces show well preserved crypts and focally coated with colorless mucus.  Serial sectioning failed to reveal any significant nodularities.  Representative sections are embedded as follows.  1A right; 1B left.     Pregnancy, Urine - Urine, Clean Catch    Specimen: Urine, Clean Catch   Result Value Ref Range    HCG, Urine QL Negative Negative   POC Glucose Once    Specimen: Blood   Result Value Ref Range    Glucose 119 70 - 130 mg/dL   POC Glucose Once    Specimen: Blood   Result Value Ref Range    Glucose 132 (H) 70 - 130 mg/dL   Results for orders placed or performed in visit on 06/13/18   Basic Metabolic Panel    Specimen: Blood   Result Value Ref Range    Glucose 117 (H) 60 - 100 mg/dL    BUN 11 8 - 21 mg/dL    Creatinine 0.56 0.50 - 1.00 mg/dL    Sodium 140 137 - 145 mmol/L    Potassium 4.7 3.5 - 5.1 mmol/L    Chloride 103 95 - 110 mmol/L    CO2 23.0 22.0 - 31.0 mmol/L    Calcium 9.6 8.4 - 10.2 mg/dL    eGFR  African Amer  71 - 165 mL/min/1.73    eGFR Non  Amer 141 71 - 165 mL/min/1.73    BUN/Creatinine Ratio 19.6 7.0 - 25.0    Anion Gap 14.0 5.0 - 15.0 mmol/L   Results for orders placed or performed during the hospital encounter of 09/04/17   CBC Auto Differential    Specimen: Blood   Result Value Ref Range    WBC 9.95 (H) 3.20 - 9.80 10*3/mm3    RBC 4.56 3.77 - 5.16 10*6/mm3    Hemoglobin 12.1 12.0 - 15.5 g/dL    Hematocrit 36.6 35.0 - 45.0 %    MCV 80.3 80.0 - 98.0 fL    MCH 26.5 26.5 - 34.0 pg    MCHC 33.1 31.4 - 36.0 g/dL    RDW 13.7 11.5 - 14.5 %    RDW-SD 39.4 36.4 - 46.3 fl    MPV 9.7 8.0 - 12.0 fL     Platelets 324 150 - 450 10*3/mm3    Neutrophil % 58.8 37.0 - 80.0 %    Lymphocyte % 33.9 10.0 - 50.0 %    Monocyte % 5.3 0.0 - 12.0 %    Eosinophil % 1.4 0.0 - 7.0 %    Basophil % 0.3 0.0 - 2.0 %    Immature Grans % 0.3 0.0 - 0.5 %    Neutrophils, Absolute 5.85 2.00 - 8.60 10*3/mm3    Lymphocytes, Absolute 3.37 0.60 - 4.20 10*3/mm3    Monocytes, Absolute 0.53 0.00 - 0.90 10*3/mm3    Eosinophils, Absolute 0.14 0.00 - 0.70 10*3/mm3    Basophils, Absolute 0.03 0.00 - 0.20 10*3/mm3    Immature Grans, Absolute 0.03 (H) 0.00 - 0.02 10*3/mm3   Rapid Strep A Screen    Specimen: Throat; Swab   Result Value Ref Range    Strep A Ag Negative Negative   POC Glucose Fingerstick    Specimen: Blood   Result Value Ref Range    Glucose 98 70 - 130 mg/dL   Beta Strep Culture, Throat    Specimen: Throat; Swab   Result Value Ref Range    Throat Culture, Beta Strep No Group A, C, or G Strep Isolated at 48 hours    Results for orders placed or performed in visit on 06/03/05   Converted Surgical Pathology    Specimen: Tissue   Result Value Ref Range    Spec Descr 1 SPECIMEN(S): A ADENOIDS     Clinical Information   CLINICAL HISTORY:  Chronic adenoiditis       Gross Description         GROSS DESCRIPTION:  The specimen consists of fragments of adenoidal tissue measuring from  1.0-2.0 cm in greatest dimension.  No sections.      Final Diagnosis         FINAL DIAGNOSIS:  CHRONIC ADENOIDITIS.                            [facility]    DIAGNOSIS CODE:  6      Comment   CLINICAL DIAGNOSIS:  Adenoids       CONVERTED (HISTORICAL) FINAL PATHOLOGIST       Diagnostician:  ALY MADDOX M.D.  Pathologist  Electronically Signed 06/06/2005           This document has been electronically signed by Emerita Harper MD on July 26, 2023 22:53 CDT

## 2023-09-08 ENCOUNTER — TRANSCRIBE ORDERS (OUTPATIENT)
Dept: LAB | Facility: HOSPITAL | Age: 23
End: 2023-09-08
Payer: MEDICAID

## 2023-09-08 ENCOUNTER — LAB (OUTPATIENT)
Dept: LAB | Facility: HOSPITAL | Age: 23
End: 2023-09-08
Payer: MEDICAID

## 2023-09-08 DIAGNOSIS — Z79.4 TYPE 2 DIABETES MELLITUS WITH OTHER SPECIFIED COMPLICATION, WITH LONG-TERM CURRENT USE OF INSULIN: ICD-10-CM

## 2023-09-08 DIAGNOSIS — E11.69 TYPE 2 DIABETES MELLITUS WITH OTHER SPECIFIED COMPLICATION, WITH LONG-TERM CURRENT USE OF INSULIN: ICD-10-CM

## 2023-09-08 DIAGNOSIS — Z79.4 ENCOUNTER FOR LONG-TERM (CURRENT) USE OF INSULIN: ICD-10-CM

## 2023-09-08 DIAGNOSIS — Z79.4 TYPE 2 DIABETES MELLITUS WITH OTHER SPECIFIED COMPLICATION, WITH LONG-TERM CURRENT USE OF INSULIN: Primary | ICD-10-CM

## 2023-09-08 DIAGNOSIS — E11.69 TYPE 2 DIABETES MELLITUS WITH OTHER SPECIFIED COMPLICATION, WITH LONG-TERM CURRENT USE OF INSULIN: Primary | ICD-10-CM

## 2023-09-08 LAB
25(OH)D3 SERPL-MCNC: 18.3 NG/ML (ref 30–100)
ALBUMIN SERPL-MCNC: 4.8 G/DL (ref 3.5–5.2)
ALBUMIN UR-MCNC: 1.5 MG/DL
ALBUMIN/GLOB SERPL: 1.5 G/DL
ALP SERPL-CCNC: 69 U/L (ref 39–117)
ALT SERPL W P-5'-P-CCNC: 45 U/L (ref 1–33)
ANION GAP SERPL CALCULATED.3IONS-SCNC: 13 MMOL/L (ref 5–15)
AST SERPL-CCNC: 41 U/L (ref 1–32)
BILIRUB SERPL-MCNC: 0.3 MG/DL (ref 0–1.2)
BUN SERPL-MCNC: 15 MG/DL (ref 6–20)
BUN/CREAT SERPL: 22.1 (ref 7–25)
CALCIUM SPEC-SCNC: 10.1 MG/DL (ref 8.6–10.5)
CHLORIDE SERPL-SCNC: 99 MMOL/L (ref 98–107)
CHOLEST SERPL-MCNC: 226 MG/DL (ref 0–200)
CO2 SERPL-SCNC: 25 MMOL/L (ref 22–29)
CREAT SERPL-MCNC: 0.68 MG/DL (ref 0.57–1)
EGFRCR SERPLBLD CKD-EPI 2021: 124.9 ML/MIN/1.73
GLOBULIN UR ELPH-MCNC: 3.2 GM/DL
GLUCOSE SERPL-MCNC: 250 MG/DL (ref 65–99)
HDLC SERPL-MCNC: 37 MG/DL (ref 40–60)
LDLC SERPL CALC-MCNC: 145 MG/DL (ref 0–100)
LDLC/HDLC SERPL: 3.82 {RATIO}
POTASSIUM SERPL-SCNC: 4.3 MMOL/L (ref 3.5–5.2)
PROT SERPL-MCNC: 8 G/DL (ref 6–8.5)
SODIUM SERPL-SCNC: 137 MMOL/L (ref 136–145)
T4 FREE SERPL-MCNC: 1.1 NG/DL (ref 0.93–1.7)
TRIGL SERPL-MCNC: 239 MG/DL (ref 0–150)
TSH SERPL DL<=0.05 MIU/L-ACNC: 2.87 UIU/ML (ref 0.27–4.2)
VIT B12 BLD-MCNC: 502 PG/ML (ref 211–946)
VLDLC SERPL-MCNC: 44 MG/DL (ref 5–40)

## 2023-09-08 PROCEDURE — 36415 COLL VENOUS BLD VENIPUNCTURE: CPT

## 2023-09-08 PROCEDURE — 82607 VITAMIN B-12: CPT

## 2023-09-08 PROCEDURE — 86341 ISLET CELL ANTIBODY: CPT

## 2023-09-08 PROCEDURE — 82306 VITAMIN D 25 HYDROXY: CPT

## 2023-09-08 PROCEDURE — 84443 ASSAY THYROID STIM HORMONE: CPT

## 2023-09-08 PROCEDURE — 86337 INSULIN ANTIBODIES: CPT

## 2023-09-08 PROCEDURE — 84439 ASSAY OF FREE THYROXINE: CPT

## 2023-09-08 PROCEDURE — 80053 COMPREHEN METABOLIC PANEL: CPT

## 2023-09-08 PROCEDURE — 80061 LIPID PANEL: CPT

## 2023-09-08 PROCEDURE — 82043 UR ALBUMIN QUANTITATIVE: CPT

## 2023-09-12 LAB — PANC ISLET CELL AB TITR SER: NEGATIVE {TITER}

## 2023-09-13 ENCOUNTER — OFFICE VISIT (OUTPATIENT)
Dept: GASTROENTEROLOGY | Facility: CLINIC | Age: 23
End: 2023-09-13
Payer: MEDICAID

## 2023-09-13 VITALS
SYSTOLIC BLOOD PRESSURE: 130 MMHG | HEIGHT: 67 IN | HEART RATE: 107 BPM | DIASTOLIC BLOOD PRESSURE: 75 MMHG | BODY MASS INDEX: 45.99 KG/M2 | WEIGHT: 293 LBS

## 2023-09-13 DIAGNOSIS — R11.0 NAUSEA: ICD-10-CM

## 2023-09-13 DIAGNOSIS — R10.10 PAIN OF UPPER ABDOMEN: Primary | ICD-10-CM

## 2023-09-13 LAB — GAD65 AB SER IA-ACNC: <5 U/ML (ref 0–5)

## 2023-09-13 RX ORDER — PROPRANOLOL HYDROCHLORIDE 20 MG/1
20 TABLET ORAL DAILY
COMMUNITY

## 2023-09-13 RX ORDER — VENLAFAXINE 75 MG/1
75 TABLET ORAL DAILY
COMMUNITY

## 2023-09-16 LAB — INSULIN AB SER-ACNC: 47 UU/ML

## 2023-09-17 LAB — ZNT8 AB SERPL IA-ACNC: <15 U/ML

## 2023-09-18 LAB — ISLET CELL512 AB SER-ACNC: <7.5 U/ML

## 2023-09-21 ENCOUNTER — HOSPITAL ENCOUNTER (OUTPATIENT)
Dept: NUCLEAR MEDICINE | Facility: HOSPITAL | Age: 23
Discharge: HOME | End: 2023-09-21
Payer: MEDICAID

## 2023-09-21 DIAGNOSIS — R10.10 PAIN OF UPPER ABDOMEN: ICD-10-CM

## 2023-09-21 DIAGNOSIS — R11.0 NAUSEA: ICD-10-CM

## 2023-09-21 PROCEDURE — A9537 TC99M MEBROFENIN: HCPCS | Performed by: INTERNAL MEDICINE

## 2023-09-21 PROCEDURE — 0 TECHNETIUM TC 99M MEBROFENIN KIT: Performed by: INTERNAL MEDICINE

## 2023-09-21 PROCEDURE — 78226 HEPATOBILIARY SYSTEM IMAGING: CPT

## 2023-09-21 RX ORDER — KIT FOR THE PREPARATION OF TECHNETIUM TC 99M MEBROFENIN 45 MG/10ML
1 INJECTION, POWDER, LYOPHILIZED, FOR SOLUTION INTRAVENOUS
Status: COMPLETED | OUTPATIENT
Start: 2023-09-21 | End: 2023-09-21

## 2023-09-21 RX ADMIN — MEBROFENIN 1 DOSE: 45 INJECTION, POWDER, LYOPHILIZED, FOR SOLUTION INTRAVENOUS at 07:20

## 2023-09-24 NOTE — PROGRESS NOTES
Chief Complaint   Patient presents with    Abdominal Pain     F/u after gallbladder us        Subjective    Megan Briceño is a 23 y.o. female.    History of Present Illness  Patient presented to GI clinic for follow-up visit today.  Has intermittent symptoms with sharp right-sided abdominal pain.  Has nausea and denied vomiting.  Bowel movements regular.  Weight is stable.  Right upper quadrant sonogram was consistent with fatty liver disease.       The following portions of the patient's history were reviewed and updated as appropriate:   Past Medical History:   Diagnosis Date    Anxiety and depression     Depression     Diabetes     Disease of thyroid gland     HYPOTHYROID    Eczema     GERD (gastroesophageal reflux disease)     Migraine     Migraines     Throat infection     frequent    Wears glasses      Past Surgical History:   Procedure Laterality Date    BREAST MASS EXCISION Right 2022    COLONOSCOPY N/A 5/11/2023    Procedure: COLONOSCOPY;  Surgeon: Emerita Harper MD;  Location: [facility] ENDOSCOPY;  Service: Gastroenterology;  Laterality: N/A;    EAR TUBES Bilateral     AT THE AGE OF 6    ENDOSCOPY N/A 5/11/2023    Procedure: ESOPHAGOGASTRODUODENOSCOPY;  Surgeon: Emerita Harper MD;  Location: [facility] ENDOSCOPY;  Service: Gastroenterology;  Laterality: N/A;    TONSILLECTOMY N/A 06/20/2018    Procedure: TONSILLECTOMY POSSIBLE ADENOIDECTOMY;  Surgeon: Yusuf Espinoza MD;  Location: [facility] OR;  Service: ENT     Family History   Problem Relation Age of Onset    Hypertension Mother     Arthritis Mother     Hypertension Father     Arthritis Father      OB History    No obstetric history on file.       Prior to Admission medications    Medication Sig Start Date End Date Taking? Authorizing Provider   clonazePAM (KlonoPIN) 0.5 MG tablet Take 1 tablet by mouth 3 (Three) Times a Day. 4/29/22  Yes Provider, MD Yamileth   dicyclomine (BENTYL) 20 MG tablet Take 1 tablet by mouth Every 6 (Six) Hours.  7/11/23  Yes Yamileth Zhang MD   empagliflozin (JARDIANCE) 25 MG tablet tablet Take 1 tablet by mouth Daily. 3/2/23  Yes Yamileth Zhang MD   erythromycin (ROMYCIN) 5 MG/GM ophthalmic ointment Administer  to both eyes Every 4 (Four) Hours While Awake. 6/3/23  Yes Jose Pinto MD   fluticasone (VERAMYST) 27.5 MCG/SPRAY nasal spray 2 sprays into the nostril(s) as directed by provider Daily.   Yes Yamileth Zhang MD   HumuLIN R U-500 KwikPen 500 UNIT/ML solution pen-injector CONCENTRATED injection Inject 110 Units under the skin into the appropriate area as directed 4 (Four) Times a Day. 3/29/23  Yes Yamileth Zhang MD   hydrOXYzine (ATARAX) 50 MG tablet Take 1 tablet by mouth 3 (Three) Times a Day. 5/4/21  Yes Yamileth Zhang MD   ibuprofen (ADVIL,MOTRIN) 600 MG tablet Take 1 tablet by mouth 3 (Three) Times a Day. 7/30/22  Yes Eric Arenas DO   Latuda 60 MG tablet tablet Take 1 tablet by mouth Every Night. 3/10/23  Yes Yamileth Zhang MD   levothyroxine (SYNTHROID, LEVOTHROID) 25 MCG tablet Take 1 tablet by mouth Daily. No med changes after last level check   Yes Yamileth Zhang MD   lisinopril (PRINIVIL,ZESTRIL) 10 MG tablet Take 1 tablet by mouth Daily. 3/18/23  Yes Yamileth Zhang MD   metFORMIN (GLUCOPHAGE) 1000 MG tablet Take 1 tablet by mouth Daily With Breakfast. 9/12/23  Yes Provider, Historical, MD   Mounjaro 7.5 MG/0.5ML solution pen-injector Inject 0.5 mL under the skin into the appropriate area as directed 1 (One) Time Per Week. 7/9/23  Yes Yamileth Zhang MD   omeprazole (priLOSEC) 40 MG capsule Take 1 capsule by mouth Daily. 3/31/23  Yes Emerita Harper MD   sucralfate (CARAFATE) 1 g tablet Take 1 tablet by mouth 4 (Four) Times a Day. 6/9/23  Yes Yamileth Zhang MD   SUMAtriptan (IMITREX) 100 MG tablet Take 1 tablet by mouth Every 2 (Two) Hours As Needed for Migraine.   Yes Provider, MD Yamileth   topiramate (TOPAMAX) 50 MG tablet  "Take 1 tablet by mouth Daily. 9/2/16  Yes Provider, MD Yamileth   propranolol (INDERAL) 20 MG tablet Take 1 tablet by mouth Daily.    Provider, MD Yamileth   venlafaxine (EFFEXOR) 75 MG tablet Take 1 tablet by mouth Daily.    Provider, MD Yamileth     Allergies   Allergen Reactions    Keflex [Cephalexin] Hives     Social History     Socioeconomic History    Marital status:    Tobacco Use    Smoking status: Never    Smokeless tobacco: Never   Vaping Use    Vaping Use: Never used   Substance and Sexual Activity    Alcohol use: No    Drug use: No    Sexual activity: Defer       Review of Systems  Review of Systems   Constitutional:  Negative for chills, fatigue, fever and unexpected weight change.   HENT:  Negative for congestion, ear discharge, hearing loss, nosebleeds and sore throat.    Eyes:  Negative for pain, discharge and redness.   Respiratory:  Negative for cough, chest tightness, shortness of breath and wheezing.    Cardiovascular:  Negative for chest pain and palpitations.   Gastrointestinal:  Positive for abdominal pain and nausea. Negative for abdominal distention, blood in stool, constipation, diarrhea and vomiting.   Endocrine: Negative for cold intolerance, polydipsia, polyphagia and polyuria.   Genitourinary:  Negative for dysuria, flank pain, frequency, hematuria and urgency.   Musculoskeletal:  Negative for arthralgias, back pain, joint swelling and myalgias.   Skin:  Negative for color change, pallor and rash.   Neurological:  Negative for tremors, seizures, syncope, weakness and headaches.   Hematological:  Negative for adenopathy. Does not bruise/bleed easily.   Psychiatric/Behavioral:  Negative for behavioral problems, confusion, dysphoric mood, hallucinations and suicidal ideas. The patient is not nervous/anxious.       /75 (BP Location: Right arm)   Pulse 107   Ht 170.2 cm (67\")   Wt (!) 139 kg (306 lb 9.6 oz)   BMI 48.02 kg/m²     Objective    Physical " Exam  Constitutional:       Appearance: She is well-developed.   HENT:      Head: Normocephalic and atraumatic.   Eyes:      Conjunctiva/sclera: Conjunctivae normal.      Pupils: Pupils are equal, round, and reactive to light.   Neck:      Thyroid: No thyromegaly.   Cardiovascular:      Rate and Rhythm: Normal rate and regular rhythm.      Heart sounds: Normal heart sounds. No murmur heard.  Pulmonary:      Effort: Pulmonary effort is normal.      Breath sounds: Normal breath sounds. No wheezing.   Abdominal:      General: Bowel sounds are normal. There is no distension.      Palpations: Abdomen is soft. There is no mass.      Tenderness: There is no abdominal tenderness.      Hernia: No hernia is present.   Genitourinary:     Comments: No lesions noted  Musculoskeletal:         General: No tenderness. Normal range of motion.      Cervical back: Normal range of motion and neck supple.   Lymphadenopathy:      Cervical: No cervical adenopathy.   Skin:     General: Skin is warm and dry.      Findings: No rash.   Neurological:      Mental Status: She is alert and oriented to person, place, and time.      Cranial Nerves: No cranial nerve deficit.   Psychiatric:         Thought Content: Thought content normal.     Lab on 09/08/2023   Component Date Value Ref Range Status    IA-2 Autoantibodies 09/08/2023 <7.5  U/mL Final    Reference Range:  <7.5        Negative  > or = 7.5  Positive    Insulin AutoAb 09/08/2023 47 (H)  uU/mL Final    This test is also known as insulin autoantibody or IAA.  This test was developed and its performance characteristics  determined by [facility]. It has not been cleared or approved  by the Food and Drug Administration.  Reference Range:  <5.0        Negative  > or = 5.0  Positive    Islet Cell Ab 09/08/2023 Negative  Neg:<1:1 Final    BALWINDER-65 09/08/2023 <5.0  0.0 - 5.0 U/mL Final    Vitamin B-12 09/08/2023 502  211 - 946 pg/mL Final    25 Hydroxy, Vitamin D 09/08/2023 18.3 (L)  30.0 - 100.0 ng/ml  Final    Total Cholesterol 09/08/2023 226 (H)  0 - 200 mg/dL Final    Triglycerides 09/08/2023 239 (H)  0 - 150 mg/dL Final    HDL Cholesterol 09/08/2023 37 (L)  40 - 60 mg/dL Final    LDL Cholesterol  09/08/2023 145 (H)  0 - 100 mg/dL Final    VLDL Cholesterol 09/08/2023 44 (H)  5 - 40 mg/dL Final    LDL/HDL Ratio 09/08/2023 3.82   Final    TSH 09/08/2023 2.870  0.270 - 4.200 uIU/mL Final    Free T4 09/08/2023 1.10  0.93 - 1.70 ng/dL Final    T4 results may be falsely increased if patient taking Biotin.    Glucose 09/08/2023 250 (H)  65 - 99 mg/dL Final    BUN 09/08/2023 15  6 - 20 mg/dL Final    Creatinine 09/08/2023 0.68  0.57 - 1.00 mg/dL Final    Sodium 09/08/2023 137  136 - 145 mmol/L Final    Potassium 09/08/2023 4.3  3.5 - 5.2 mmol/L Final    Chloride 09/08/2023 99  98 - 107 mmol/L Final    CO2 09/08/2023 25.0  22.0 - 29.0 mmol/L Final    Calcium 09/08/2023 10.1  8.6 - 10.5 mg/dL Final    Total Protein 09/08/2023 8.0  6.0 - 8.5 g/dL Final    Albumin 09/08/2023 4.8  3.5 - 5.2 g/dL Final    ALT (SGPT) 09/08/2023 45 (H)  1 - 33 U/L Final    AST (SGOT) 09/08/2023 41 (H)  1 - 32 U/L Final    Alkaline Phosphatase 09/08/2023 69  39 - 117 U/L Final    Total Bilirubin 09/08/2023 0.3  0.0 - 1.2 mg/dL Final    Globulin 09/08/2023 3.2  gm/dL Final    A/G Ratio 09/08/2023 1.5  g/dL Final    BUN/Creatinine Ratio 09/08/2023 22.1  7.0 - 25.0 Final    Anion Gap 09/08/2023 13.0  5.0 - 15.0 mmol/L Final    eGFR 09/08/2023 124.9  >60.0 mL/min/1.73 Final    Microalbumin, Urine 09/08/2023 1.5  mg/dL Final    ZNT8 Antibodies 09/08/2023 <15  U/mL Final    Reference Range:  All Ages:  <15      Negative  > or =15 Positive     Assessment & Plan      1. Pain of upper abdomen    2. Nausea    1.  Right upper quadrant pain with nausea rule out biliary dyskinesia.  Change diet to low-fat.  Continue PPI.  Add probiotics.  Obtain HIDA scan.  Reviewed abdominal pain with diarrhea, well controlled.  Continue Bentyl and high-fiber diet.  3.   Obesity, recommend exercise and diet control.  4.  Fatty liver disease, repeat LFTs in next visit.  Patient to continue exercise and diet control.       Orders placed during this encounter include:  Orders Placed This Encounter   Procedures    NM Hepatobiliary Without CCK     Standing Status:   Future     Number of Occurrences:   1     Standing Expiration Date:   9/13/2024     Order Specific Question:   Do you want ejection fraction for this procedure?     Answer:   Yes     Order Specific Question:   Release to patient     Answer:   Routine Release [[id number]]       * Surgery not found *    Review and/or summary of lab tests, radiology, procedures, medications. Review and summary of old records and obtaining of history. The risks and benefits of my recommendations, as well as other treatment options were discussed with the patient today. Questions were answered.    No orders of the defined types were placed in this encounter.      Follow-up: Return in about 1 month (around 10/13/2023).               Results for orders placed or performed in visit on 09/08/23   ZNT8 Antibodies    Specimen: Blood   Result Value Ref Range    ZNT8 Antibodies <15 U/mL   TSH+Free T4    Specimen: Blood   Result Value Ref Range    TSH 2.870 0.270 - 4.200 uIU/mL    Free T4 1.10 0.93 - 1.70 ng/dL   IA-2 Autoantibodies    Specimen: Blood   Result Value Ref Range    IA-2 Autoantibodies <7.5 U/mL   Glutamic Acid Decarboxylase    Specimen: Blood   Result Value Ref Range    BALWINDER-65 <5.0 0.0 - 5.0 U/mL   Insulin Antibody    Specimen: Blood   Result Value Ref Range    Insulin AutoAb 47 (H) uU/mL   MicroAlbumin, Urine, Random - Urine, Clean Catch    Specimen: Urine, Clean Catch   Result Value Ref Range    Microalbumin, Urine 1.5 mg/dL   Vitamin D 25 Hydroxy    Specimen: Blood   Result Value Ref Range    25 Hydroxy, Vitamin D 18.3 (L) 30.0 - 100.0 ng/ml   Anti-islet Cell Antibody    Specimen: Blood   Result Value Ref Range    Islet Cell Ab Negative  Neg:<1:1   Vitamin B12    Specimen: Blood   Result Value Ref Range    Vitamin B-12 502 211 - 946 pg/mL   Lipid Panel    Specimen: Blood   Result Value Ref Range    Total Cholesterol 226 (H) 0 - 200 mg/dL    Triglycerides 239 (H) 0 - 150 mg/dL    HDL Cholesterol 37 (L) 40 - 60 mg/dL    LDL Cholesterol  145 (H) 0 - 100 mg/dL    VLDL Cholesterol 44 (H) 5 - 40 mg/dL    LDL/HDL Ratio 3.82    Comprehensive Metabolic Panel    Specimen: Blood   Result Value Ref Range    Glucose 250 (H) 65 - 99 mg/dL    BUN 15 6 - 20 mg/dL    Creatinine 0.68 0.57 - 1.00 mg/dL    Sodium 137 136 - 145 mmol/L    Potassium 4.3 3.5 - 5.2 mmol/L    Chloride 99 98 - 107 mmol/L    CO2 25.0 22.0 - 29.0 mmol/L    Calcium 10.1 8.6 - 10.5 mg/dL    Total Protein 8.0 6.0 - 8.5 g/dL    Albumin 4.8 3.5 - 5.2 g/dL    ALT (SGPT) 45 (H) 1 - 33 U/L    AST (SGOT) 41 (H) 1 - 32 U/L    Alkaline Phosphatase 69 39 - 117 U/L    Total Bilirubin 0.3 0.0 - 1.2 mg/dL    Globulin 3.2 gm/dL    A/G Ratio 1.5 g/dL    BUN/Creatinine Ratio 22.1 7.0 - 25.0    Anion Gap 13.0 5.0 - 15.0 mmol/L    eGFR 124.9 >60.0 mL/min/1.73   Results for orders placed or performed during the hospital encounter of 23   TISSUE EXAM, [facility] ([facility]JUN,[facility])    Specimen: A: Small Intestine, Duodenum; Tissue    B: Gastric, Antrum; Tissue    C: Esophagus, Distal; Tissue    D: Small Intestine, Ileum; Tissue    E: Large Intestine; Tissue    F: Large Intestine, Sigmoid Colon; Tissue   Result Value Ref Range    Reference Lab Report       [facility]  [address] [place] [address]  Phone: [phone] or [phone]  Fax: [phone]  Akira Encarnacion M.D., Medical Director    PATIENT NAME                           LABORATORY NO.  GALLO LPOEZ.                  [id number]  [id number]                         AGE              SEX  SSN           CLIENT REF #  [facility]           22      2000  F    [id number]    [id number]    [place]                       REQUESTING M.D.     ATTENDING MEvelynD.     COPY TO.  [address] [place] [address]                 [place] [address]             [place]  DATE COLLECTED      DATE RECEIVED      DATE REPORTED  05/11/2023 05/11/2023 05/12/2023    DIAGNOSIS:  A.   DUODENUM, BIOPSY:  No significant histologic abnormality  B.   ANTRUM, BIOPSY:  Few lymphoid follicles  C.   GE JUNCTION:  Reactive gastric mucosa and scant reactive squamous mucosa  Negative for specialized  Romero's mucosa  D.   TERMINAL ILEUM BIOPSY:  No significant histologic abnormality  E.   COLON, BIOPSY:  No significant histologic abnormality  F.   SIGMOID COLON POLYP:  Hyperplastic polyp    JBS/pah    CLINICAL HISTORY:  Pain of upper abdomen, nausea, diarrhea, other constipation    SPECIMENS RECEIVED:  A.  DUODENUM, BIOPSY  B.  ANTRUM, BIOPSY  C.  GE JUNCTION  D.  TERMINAL ILEUM BIOPSY  E.  COLON, BIOPSY  F.  SIGMOID COLON POLYP    MICROSCOPIC DESCRIPTION:  Tissue blocks are prepared and slides are examined microscopically on all  specimens. See diagnosis for details.    Professional interpretation rendered by Baldev Beltrán M.D. at [employer] [employer] [facility], [address] [place] [address].    GROSS DESCRIPTION:  A.  Labeled as \"duodenum\", consisting of 1 piece of tan soft tissue measuring  0.5 x 0.2 x 0.2 cm, submitted entirely in 1 cassette.  SOG  B.  Labeled as \"gastric antrum\", consisting of 2 pieces of tan soft tissue  measuring 0.4 x 0.2 x 0.2 cm, submitted entirely in  1 cassette.  C.  Labeled as \"GE junction\", consisting of 1 piece of tan soft tissue measuring  0.3 x 0.2 x 0.2 cm, submitted entirely in 1 cassette.  D.  Labeled as \"terminal ileum\", consisting of 2 pieces of tan soft tissue  measuring 0.4 x 0.3 x 0.2 cm, submitted entirely cassette.  E.  Labeled as \"colonic mucosa\", consisting of 2 pieces of tan soft tissue  measuring 0.8 x 0.4 x 0.2 cm, submitted " "entirely in 1 cassette.  F.  Labeled as \"sigmoid colon polyp\", consisting of 1 piece of tan soft tissue  measuring 0.6 x 0.3 x 0.2 cm, submitted entirely in 1 cassette.    REVIEWED, DIAGNOSED AND ELECTRONICALLY  SIGNED BY:    Baldev Beltrán M.D.  CPT CODES:  88305x6     POC Glucose Once    Specimen: Blood   Result Value Ref Range    Glucose 248 (H) 70 - 130 mg/dL   Duodenal Culture, Quantitative - Body Fluid, Small Intestine, Duodenum    Specimen: Small Intestine, Duodenum; Body Fluid   Result Value Ref Range    Duodenal Culture No growth at 2 days    Results for orders placed or performed in visit on 03/31/23   Hepatic Function Panel    Specimen: Blood   Result Value Ref Range    Total Protein 8.0 6.0 - 8.5 g/dL    Albumin 4.6 3.5 - 5.2 g/dL    ALT (SGPT) 73 (H) 1 - 33 U/L    AST (SGOT) 50 (H) 1 - 32 U/L    Alkaline Phosphatase 69 39 - 117 U/L    Total Bilirubin 0.4 0.0 - 1.2 mg/dL    Bilirubin, Direct <0.2 0.0 - 0.3 mg/dL    Bilirubin, Indirect     Results for orders placed or performed during the hospital encounter of 06/20/18   Tissue Pathology Exam    Specimen: Tonsils; Tissue   Result Value Ref Range    Case Report       Surgical Pathology Report                         Case: [id number]                                  Authorizing Provider:  Yusuf Espinoza MD Collected:           06/20/2018 09:35 AM          Ordering Location:     [facility]             Received:            06/20/2018 10:07 AM                                 [facility]                                                              Pathologist:           Wayne Aguilera MD                                                         Specimen:    Tonsils, right tagged                                                                      Final Diagnosis       CHRONIC TONSILLITIS (2).      Gross Description       The specimen consists of 2 pieces of tonsils almost equal in size and shape, one measuring 3.0 x 2.5 x 2.5 cm.  Their " surfaces show well preserved crypts and focally coated with colorless mucus.  Serial sectioning failed to reveal any significant nodularities.  Representative sections are embedded as follows.  1A right; 1B left.     Pregnancy, Urine - Urine, Clean Catch    Specimen: Urine, Clean Catch   Result Value Ref Range    HCG, Urine QL Negative Negative   POC Glucose Once    Specimen: Blood   Result Value Ref Range    Glucose 119 70 - 130 mg/dL   POC Glucose Once    Specimen: Blood   Result Value Ref Range    Glucose 132 (H) 70 - 130 mg/dL   Results for orders placed or performed in visit on 06/13/18   Basic Metabolic Panel    Specimen: Blood   Result Value Ref Range    Glucose 117 (H) 60 - 100 mg/dL    BUN 11 8 - 21 mg/dL    Creatinine 0.56 0.50 - 1.00 mg/dL    Sodium 140 137 - 145 mmol/L    Potassium 4.7 3.5 - 5.1 mmol/L    Chloride 103 95 - 110 mmol/L    CO2 23.0 22.0 - 31.0 mmol/L    Calcium 9.6 8.4 - 10.2 mg/dL    eGFR  African Amer  71 - 165 mL/min/1.73    eGFR Non  Amer 141 71 - 165 mL/min/1.73    BUN/Creatinine Ratio 19.6 7.0 - 25.0    Anion Gap 14.0 5.0 - 15.0 mmol/L   Results for orders placed or performed during the hospital encounter of 09/04/17   CBC Auto Differential    Specimen: Blood   Result Value Ref Range    WBC 9.95 (H) 3.20 - 9.80 10*3/mm3    RBC 4.56 3.77 - 5.16 10*6/mm3    Hemoglobin 12.1 12.0 - 15.5 g/dL    Hematocrit 36.6 35.0 - 45.0 %    MCV 80.3 80.0 - 98.0 fL    MCH 26.5 26.5 - 34.0 pg    MCHC 33.1 31.4 - 36.0 g/dL    RDW 13.7 11.5 - 14.5 %    RDW-SD 39.4 36.4 - 46.3 fl    MPV 9.7 8.0 - 12.0 fL    Platelets 324 150 - 450 10*3/mm3    Neutrophil % 58.8 37.0 - 80.0 %    Lymphocyte % 33.9 10.0 - 50.0 %    Monocyte % 5.3 0.0 - 12.0 %    Eosinophil % 1.4 0.0 - 7.0 %    Basophil % 0.3 0.0 - 2.0 %    Immature Grans % 0.3 0.0 - 0.5 %    Neutrophils, Absolute 5.85 2.00 - 8.60 10*3/mm3    Lymphocytes, Absolute 3.37 0.60 - 4.20 10*3/mm3    Monocytes, Absolute 0.53 0.00 - 0.90 10*3/mm3    Eosinophils,  Absolute 0.14 0.00 - 0.70 10*3/mm3    Basophils, Absolute 0.03 0.00 - 0.20 10*3/mm3    Immature Grans, Absolute 0.03 (H) 0.00 - 0.02 10*3/mm3   Rapid Strep A Screen    Specimen: Throat; Swab   Result Value Ref Range    Strep A Ag Negative Negative   POC Glucose Fingerstick    Specimen: Blood   Result Value Ref Range    Glucose 98 70 - 130 mg/dL   Beta Strep Culture, Throat    Specimen: Throat; Swab   Result Value Ref Range    Throat Culture, Beta Strep No Group A, C, or G Strep Isolated at 48 hours    Results for orders placed or performed in visit on 06/03/05   Converted Surgical Pathology    Specimen: Tissue   Result Value Ref Range    Spec Descr 1 SPECIMEN(S): A ADENOIDS     Clinical Information   CLINICAL HISTORY:  Chronic adenoiditis       Gross Description         GROSS DESCRIPTION:  The specimen consists of fragments of adenoidal tissue measuring from  1.0-2.0 cm in greatest dimension.  No sections.      Final Diagnosis         FINAL DIAGNOSIS:  CHRONIC ADENOIDITIS.                            [facility] [place]NOSIS [place]:  6      Comment   CLINICAL DIAGNOSIS:  Adenoids       CONVERTED (HISTORICAL) FINAL PATHOLOGIST       Diagnostician:  ALY MADDOX M.D.  Pathologist  Electronically Signed 06/06/2005           This document has been electronically signed by Emerita Harper MD on September 24, 2023 15:20 CDT

## 2023-09-29 ENCOUNTER — OFFICE VISIT (OUTPATIENT)
Dept: GASTROENTEROLOGY | Facility: CLINIC | Age: 23
End: 2023-09-29
Payer: MEDICAID

## 2023-09-29 VITALS
SYSTOLIC BLOOD PRESSURE: 124 MMHG | HEIGHT: 67 IN | WEIGHT: 293 LBS | DIASTOLIC BLOOD PRESSURE: 86 MMHG | HEART RATE: 93 BPM | BODY MASS INDEX: 45.99 KG/M2

## 2023-09-29 DIAGNOSIS — R11.0 NAUSEA: Primary | ICD-10-CM

## 2023-09-29 DIAGNOSIS — R10.10 PAIN OF UPPER ABDOMEN: ICD-10-CM

## 2023-09-29 PROBLEM — E55.9 VITAMIN D DEFICIENCY: Status: ACTIVE | Noted: 2023-09-20

## 2023-09-29 PROBLEM — G56.03 BILATERAL CARPAL TUNNEL SYNDROME: Status: ACTIVE | Noted: 2023-08-22

## 2023-09-29 PROBLEM — E78.00 HYPERCHOLESTEROLEMIA: Status: ACTIVE | Noted: 2023-09-20

## 2023-09-29 PROCEDURE — 1159F MED LIST DOCD IN RCRD: CPT | Performed by: INTERNAL MEDICINE

## 2023-09-29 PROCEDURE — 99214 OFFICE O/P EST MOD 30 MIN: CPT | Performed by: INTERNAL MEDICINE

## 2023-09-29 PROCEDURE — 1160F RVW MEDS BY RX/DR IN RCRD: CPT | Performed by: INTERNAL MEDICINE

## 2023-09-29 RX ORDER — ROSUVASTATIN CALCIUM 20 MG/1
20 TABLET, COATED ORAL
COMMUNITY
Start: 2023-09-20 | End: 2024-09-20

## 2023-09-29 RX ORDER — MOMETASONE FUROATE AND FORMOTEROL FUMARATE DIHYDRATE 200; 5 UG/1; UG/1
2 AEROSOL RESPIRATORY (INHALATION)
COMMUNITY
Start: 2023-09-20 | End: 2024-09-20

## 2023-09-29 RX ORDER — LANCETS 33 GAUGE
EACH MISCELLANEOUS
COMMUNITY
Start: 2023-09-17

## 2023-09-29 RX ORDER — BLOOD SUGAR DIAGNOSTIC
STRIP MISCELLANEOUS
COMMUNITY
Start: 2023-09-17

## 2023-09-29 RX ORDER — LEVOCETIRIZINE DIHYDROCHLORIDE 5 MG/1
TABLET, FILM COATED ORAL
COMMUNITY
Start: 2023-08-27

## 2023-09-29 RX ORDER — PEN NEEDLE, DIABETIC 32GX 5/32"
NEEDLE, DISPOSABLE MISCELLANEOUS
COMMUNITY
Start: 2023-09-19

## 2023-09-29 RX ORDER — MELOXICAM 15 MG/1
15 TABLET ORAL DAILY
Qty: 30 TABLET | Refills: 3 | COMMUNITY
Start: 2023-08-30 | End: 2023-11-29

## 2023-09-29 RX ORDER — NORTRIPTYLINE HYDROCHLORIDE 50 MG/1
50 CAPSULE ORAL NIGHTLY
Qty: 30 CAPSULE | Refills: 6 | Status: SHIPPED | OUTPATIENT
Start: 2023-09-29 | End: 2023-10-29

## 2023-09-29 RX ORDER — PROMETHAZINE HYDROCHLORIDE 25 MG/1
25 TABLET ORAL
COMMUNITY

## 2023-09-29 RX ORDER — PRAVASTATIN SODIUM 40 MG
TABLET ORAL
COMMUNITY
Start: 2023-07-22

## 2023-09-30 NOTE — PROGRESS NOTES
Chief Complaint   Patient presents with    Nausea    Abdominal Pain     F/U after Gallbladder US        Subjective    Megan Briceño is a 23 y.o. female.    History of Present Illness    Patient presented to GI clinic for follow-up visit today.  Abdominal pain has improved some.  Has nausea.  Vomiting has resolved.  Diarrhea has improved.  Denied rectal bleeding.  Weight is stable.  HIDA scan is unremarkable.     The following portions of the patient's history were reviewed and updated as appropriate:   Past Medical History:   Diagnosis Date    Anxiety and depression     Depression     Diabetes     Disease of thyroid gland     HYPOTHYROID    Eczema     GERD (gastroesophageal reflux disease)     Migraine     Migraines     Throat infection     frequent    Wears glasses      Past Surgical History:   Procedure Laterality Date    BREAST MASS EXCISION Right 2022    COLONOSCOPY N/A 5/11/2023    Procedure: COLONOSCOPY;  Surgeon: Emerita Harper MD;  Location: [facility] ENDOSCOPY;  Service: Gastroenterology;  Laterality: N/A;    EAR TUBES Bilateral     AT THE AGE OF 6    ENDOSCOPY N/A 5/11/2023    Procedure: ESOPHAGOGASTRODUODENOSCOPY;  Surgeon: Emerita Harper MD;  Location: [facility] ENDOSCOPY;  Service: Gastroenterology;  Laterality: N/A;    TONSILLECTOMY N/A 06/20/2018    Procedure: TONSILLECTOMY POSSIBLE ADENOIDECTOMY;  Surgeon: Yusuf Espinoza MD;  Location: [facility] OR;  Service: ENT     Family History   Problem Relation Age of Onset    Hypertension Mother     Arthritis Mother     Hypertension Father     Arthritis Father      OB History    No obstetric history on file.       Prior to Admission medications    Medication Sig Start Date End Date Taking? Authorizing Provider   BD Pen Needle Symone 2nd Gen 32G X 4 MM misc  9/19/23  Yes ProviderYamileth MD   Cholecalciferol 50 MCG (2000 UT) tablet Take 1 tablet by mouth. 9/20/23  Yes Provider, MD Yamileth   clonazePAM (KlonoPIN) 0.5 MG tablet Take 1 tablet  by mouth 3 (Three) Times a Day. 4/29/22  Yes Yamileth Zhang MD   dicyclomine (BENTYL) 20 MG tablet Take 1 tablet by mouth Every 6 (Six) Hours. 7/11/23  Yes Yamileth Zhang MD   Dulera 200-5 MCG/ACT inhaler Inhale 2 puffs. 9/20/23 9/20/24 Yes Yamileth Zhang MD   empagliflozin (JARDIANCE) 25 MG tablet tablet Take 1 tablet by mouth Daily. 3/2/23  Yes Yamileth Zhang MD   erythromycin (ROMYCIN) 5 MG/GM ophthalmic ointment Administer  to both eyes Every 4 (Four) Hours While Awake. 6/3/23  Yes Jose Pinto MD   fluticasone (VERAMYST) 27.5 MCG/SPRAY nasal spray 2 sprays into the nostril(s) as directed by provider Daily.   Yes Yamileth Zhang MD   HumuLIN R U-500 KwikPen 500 UNIT/ML solution pen-injector CONCENTRATED injection Inject 110 Units under the skin into the appropriate area as directed 4 (Four) Times a Day. 3/29/23  Yes Yamileth Zhang MD   hydrOXYzine (ATARAX) 50 MG tablet Take 1 tablet by mouth 3 (Three) Times a Day. 5/4/21  Yes Yamileth Zhang MD   ibuprofen (ADVIL,MOTRIN) 600 MG tablet Take 1 tablet by mouth 3 (Three) Times a Day. 7/30/22  Yes Eric Arenas, DO   Lancets (OneTouch Delica Plus [id number]) misc  9/17/23  Yes ProviderYamileth MD   Latuda 60 MG tablet tablet Take 1 tablet by mouth Every Night. 3/10/23  Yes Yamileth Zhang MD   levocetirizine (XYZAL) 5 MG tablet  8/27/23  Yes ProviderYamileth MD   levothyroxine (SYNTHROID, LEVOTHROID) 25 MCG tablet Take 1 tablet by mouth Daily. No med changes after last level check   Yes Yamileth Zhang MD   lisinopril (PRINIVIL,ZESTRIL) 10 MG tablet Take 1 tablet by mouth Daily. 3/18/23  Yes Yamileth Zhang MD   meloxicam (MOBIC) 15 MG tablet Take 1 tablet by mouth Daily. 8/30/23 11/29/23 Yes Yamileth Zhang MD   metFORMIN (GLUCOPHAGE) 1000 MG tablet Take 1 tablet by mouth Daily With Breakfast. 9/12/23  Yes Provider, Historical, MD   Mounjaro 7.5 MG/0.5ML solution pen-injector  Inject 0.5 mL under the skin into the appropriate area as directed 1 (One) Time Per Week. 7/9/23  Yes Yamileth Zhang MD   omeprazole (priLOSEC) 40 MG capsule Take 1 capsule by mouth Daily. 3/31/23  Yes Emerita Harper MD   OneTouch Ultra test strip  9/17/23  Yes Yamileth Zhang MD   pravastatin (PRAVACHOL) 40 MG tablet  7/22/23  Yes Yamileth Zhang MD   ProAir  (90 Base) MCG/ACT inhaler Inhale 2 puffs. 9/20/23  Yes Yamileth Zhang MD   promethazine (PHENERGAN) 25 MG tablet Take 1 tablet by mouth.   Yes Yamileth Zhang MD   propranolol (INDERAL) 20 MG tablet Take 1 tablet by mouth Daily.   Yes Yamileth Zhang MD   rosuvastatin (CRESTOR) 20 MG tablet Take 1 tablet by mouth. 9/20/23 9/20/24 Yes Yamileth Zhang MD   sucralfate (CARAFATE) 1 g tablet Take 1 tablet by mouth 4 (Four) Times a Day. 6/9/23  Yes Yamileth Zhang MD   SUMAtriptan (IMITREX) 100 MG tablet Take 1 tablet by mouth Every 2 (Two) Hours As Needed for Migraine.   Yes ProviderYamileth MD   topiramate (TOPAMAX) 50 MG tablet Take 1 tablet by mouth Daily. 9/2/16  Yes Yamileth Zhang MD   venlafaxine (EFFEXOR) 75 MG tablet Take 1 tablet by mouth Daily.   Yes Yamileth Zhang MD   nortriptyline (Pamelor) 50 MG capsule Take 1 capsule by mouth Every Night for 30 days. 9/29/23 10/29/23  Emerita Harper MD     Allergies   Allergen Reactions    Keflex [Cephalexin] Hives     Social History     Socioeconomic History    Marital status:    Tobacco Use    Smoking status: Never    Smokeless tobacco: Never   Vaping Use    Vaping Use: Never used   Substance and Sexual Activity    Alcohol use: No    Drug use: No    Sexual activity: Defer       Review of Systems  Review of Systems   Constitutional:  Negative for chills, fatigue, fever and unexpected weight change.   HENT:  Negative for congestion, ear discharge, hearing loss, nosebleeds and sore throat.    Eyes:  Negative for pain, discharge  "and redness.   Respiratory:  Negative for cough, chest tightness, shortness of breath and wheezing.    Cardiovascular:  Negative for chest pain and palpitations.   Gastrointestinal:  Positive for abdominal pain, diarrhea and nausea. Negative for abdominal distention, blood in stool, constipation and vomiting.   Endocrine: Negative for cold intolerance, polydipsia, polyphagia and polyuria.   Genitourinary:  Negative for dysuria, flank pain, frequency, hematuria and urgency.   Musculoskeletal:  Negative for arthralgias, back pain, joint swelling and myalgias.   Skin:  Negative for color change, pallor and rash.   Neurological:  Negative for tremors, seizures, syncope, weakness and headaches.   Hematological:  Negative for adenopathy. Does not bruise/bleed easily.   Psychiatric/Behavioral:  Negative for behavioral problems, confusion, dysphoric mood, hallucinations and suicidal ideas. The patient is not nervous/anxious.       /86 (BP Location: Right arm, Patient Position: Sitting)   Pulse 93   Ht 170.2 cm (67\")   Wt (!) 140 kg (307 lb 12.8 oz)   BMI 48.21 kg/m²     Objective    Physical Exam  Constitutional:       Appearance: She is well-developed.   HENT:      Head: Normocephalic and atraumatic.   Eyes:      Conjunctiva/sclera: Conjunctivae normal.      Pupils: Pupils are equal, round, and reactive to light.   Neck:      Thyroid: No thyromegaly.   Cardiovascular:      Rate and Rhythm: Normal rate and regular rhythm.      Heart sounds: Normal heart sounds. No murmur heard.  Pulmonary:      Effort: Pulmonary effort is normal.      Breath sounds: Normal breath sounds. No wheezing.   Abdominal:      General: Bowel sounds are normal. There is no distension.      Palpations: Abdomen is soft. There is no mass.      Tenderness: There is no abdominal tenderness.      Hernia: No hernia is present.   Genitourinary:     Comments: No lesions noted  Musculoskeletal:         General: No tenderness. Normal range of motion. "      Cervical back: Normal range of motion and neck supple.   Lymphadenopathy:      Cervical: No cervical adenopathy.   Skin:     General: Skin is warm and dry.      Findings: No rash.   Neurological:      Mental Status: She is alert and oriented to person, place, and time.      Cranial Nerves: No cranial nerve deficit.   Psychiatric:         Thought Content: Thought content normal.     Lab on 09/08/2023   Component Date Value Ref Range Status    IA-2 Autoantibodies 09/08/2023 <7.5  U/mL Final    Reference Range:  <7.5        Negative  > or = 7.5  Positive    Insulin AutoAb 09/08/2023 47 (H)  uU/mL Final    This test is also known as insulin autoantibody or IAA.  This test was developed and its performance characteristics  determined by [facility]. It has not been cleared or approved  by the Food and Drug Administration.  Reference Range:  <5.0        Negative  > or = 5.0  Positive    Islet Cell Ab 09/08/2023 Negative  Neg:<1:1 Final    BALWINDER-65 09/08/2023 <5.0  0.0 - 5.0 U/mL Final    Vitamin B-12 09/08/2023 502  211 - 946 pg/mL Final    25 Hydroxy, Vitamin D 09/08/2023 18.3 (L)  30.0 - 100.0 ng/ml Final    Total Cholesterol 09/08/2023 226 (H)  0 - 200 mg/dL Final    Triglycerides 09/08/2023 239 (H)  0 - 150 mg/dL Final    HDL Cholesterol 09/08/2023 37 (L)  40 - 60 mg/dL Final    LDL Cholesterol  09/08/2023 145 (H)  0 - 100 mg/dL Final    VLDL Cholesterol 09/08/2023 44 (H)  5 - 40 mg/dL Final    LDL/HDL Ratio 09/08/2023 3.82   Final    TSH 09/08/2023 2.870  0.270 - 4.200 uIU/mL Final    Free T4 09/08/2023 1.10  0.93 - 1.70 ng/dL Final    T4 results may be falsely increased if patient taking Biotin.    Glucose 09/08/2023 250 (H)  65 - 99 mg/dL Final    BUN 09/08/2023 15  6 - 20 mg/dL Final    Creatinine 09/08/2023 0.68  0.57 - 1.00 mg/dL Final    Sodium 09/08/2023 137  136 - 145 mmol/L Final    Potassium 09/08/2023 4.3  3.5 - 5.2 mmol/L Final    Chloride 09/08/2023 99  98 - 107 mmol/L Final    CO2 09/08/2023 25.0  22.0 -  29.0 mmol/L Final    Calcium 09/08/2023 10.1  8.6 - 10.5 mg/dL Final    Total Protein 09/08/2023 8.0  6.0 - 8.5 g/dL Final    Albumin 09/08/2023 4.8  3.5 - 5.2 g/dL Final    ALT (SGPT) 09/08/2023 45 (H)  1 - 33 U/L Final    AST (SGOT) 09/08/2023 41 (H)  1 - 32 U/L Final    Alkaline Phosphatase 09/08/2023 69  39 - 117 U/L Final    Total Bilirubin 09/08/2023 0.3  0.0 - 1.2 mg/dL Final    Globulin 09/08/2023 3.2  gm/dL Final    A/G Ratio 09/08/2023 1.5  g/dL Final    BUN/Creatinine Ratio 09/08/2023 22.1  7.0 - 25.0 Final    Anion Gap 09/08/2023 13.0  5.0 - 15.0 mmol/L Final    eGFR 09/08/2023 124.9  >60.0 mL/min/1.73 Final    Microalbumin, Urine 09/08/2023 1.5  mg/dL Final    ZNT8 Antibodies 09/08/2023 <15  U/mL Final    Reference Range:  All Ages:  <15      Negative  > or =15 Positive     Assessment & Plan      1. Nausea    2. Pain of upper abdomen    1.  Right upper quadrant pain with nausea rule out gastroparesis.  Continue PPI and add Pamelor.  Obtain gastric emptying scan for further evaluation.  2.  Abdominal pain with diarrhea, well controlled. Continue Bentyl.  3.  Obesity, recommend exercise and diet control.  4.  Fatty liver disease, continue exercise and diet control.  Repeat LFTs in next visit.       Orders placed during this encounter include:  No orders of the defined types were placed in this encounter.      * Surgery not found *    Review and/or summary of lab tests, radiology, procedures, medications. Review and summary of old records and obtaining of history. The risks and benefits of my recommendations, as well as other treatment options were discussed with the patient today. Questions were answered.    New Medications Ordered This Visit   Medications    nortriptyline (Pamelor) 50 MG capsule     Sig: Take 1 capsule by mouth Every Night for 30 days.     Dispense:  30 capsule     Refill:  6       Follow-up: Return in about 1 month (around 10/29/2023).               Results for orders placed or  performed in visit on 09/08/23   ZNT8 Antibodies    Specimen: Blood   Result Value Ref Range    ZNT8 Antibodies <15 U/mL   TSH+Free T4    Specimen: Blood   Result Value Ref Range    TSH 2.870 0.270 - 4.200 uIU/mL    Free T4 1.10 0.93 - 1.70 ng/dL   IA-2 Autoantibodies    Specimen: Blood   Result Value Ref Range    IA-2 Autoantibodies <7.5 U/mL   Glutamic Acid Decarboxylase    Specimen: Blood   Result Value Ref Range    BALWIDNER-65 <5.0 0.0 - 5.0 U/mL   Insulin Antibody    Specimen: Blood   Result Value Ref Range    Insulin AutoAb 47 (H) uU/mL   MicroAlbumin, Urine, Random - Urine, Clean Catch    Specimen: Urine, Clean Catch   Result Value Ref Range    Microalbumin, Urine 1.5 mg/dL   Vitamin D 25 Hydroxy    Specimen: Blood   Result Value Ref Range    25 Hydroxy, Vitamin D 18.3 (L) 30.0 - 100.0 ng/ml   Anti-islet Cell Antibody    Specimen: Blood   Result Value Ref Range    Islet Cell Ab Negative Neg:<1:1   Vitamin B12    Specimen: Blood   Result Value Ref Range    Vitamin B-12 502 211 - 946 pg/mL   Lipid Panel    Specimen: Blood   Result Value Ref Range    Total Cholesterol 226 (H) 0 - 200 mg/dL    Triglycerides 239 (H) 0 - 150 mg/dL    HDL Cholesterol 37 (L) 40 - 60 mg/dL    LDL Cholesterol  145 (H) 0 - 100 mg/dL    VLDL Cholesterol 44 (H) 5 - 40 mg/dL    LDL/HDL Ratio 3.82    Comprehensive Metabolic Panel    Specimen: Blood   Result Value Ref Range    Glucose 250 (H) 65 - 99 mg/dL    BUN 15 6 - 20 mg/dL    Creatinine 0.68 0.57 - 1.00 mg/dL    Sodium 137 136 - 145 mmol/L    Potassium 4.3 3.5 - 5.2 mmol/L    Chloride 99 98 - 107 mmol/L    CO2 25.0 22.0 - 29.0 mmol/L    Calcium 10.1 8.6 - 10.5 mg/dL    Total Protein 8.0 6.0 - 8.5 g/dL    Albumin 4.8 3.5 - 5.2 g/dL    ALT (SGPT) 45 (H) 1 - 33 U/L    AST (SGOT) 41 (H) 1 - 32 U/L    Alkaline Phosphatase 69 39 - 117 U/L    Total Bilirubin 0.3 0.0 - 1.2 mg/dL    Globulin 3.2 gm/dL    A/G Ratio 1.5 g/dL    BUN/Creatinine Ratio 22.1 7.0 - 25.0    Anion Gap 13.0 5.0 - 15.0 mmol/L     eGFR 124.9 >60.0 mL/min/1.73   Results for orders placed or performed during the hospital encounter of 23   TISSUE EXAM, [facility] ([facility])    Specimen: A: Small Intestine, Duodenum; Tissue    B: Gastric, Antrum; Tissue    C: Esophagus, Distal; Tissue    D: Small Intestine, Ileum; Tissue    E: Large Intestine; Tissue    F: Large Intestine, Sigmoid Colon; Tissue   Result Value Ref Range    Reference Lab Report       [facility]  [address] [place] [address]  Phone: [phone] or [phone]  Fax: [phone]  Akira Encarnacion M.D., Medical Director    PATIENT NAME                           LABORATORY NO.  GALLO LOPEZ.                  [id number]  [id number]                         AGE              SEX  SSN           CLIENT REF #  [facility]           22      2000  F    [id number]   [id number]    [place]                       REQUESTING M.D.     ATTENDING M.D.     COPY TO[address]  DATE COLLECTED      DATE RECEIVED      DATE REPORTED  2023    DIAGNOSIS:  A.   DUODENUM, BIOPSY:  No significant histologic abnormality  B.   ANTRUM, BIOPSY:  Few lymphoid follicles  C.   GE JUNCTION:  Reactive gastric mucosa and scant reactive squamous mucosa  Negative for specialized  Romero's mucosa  D.   TERMINAL ILEUM BIOPSY:  No significant histologic abnormality  E.   COLON, BIOPSY:  No significant histologic abnormality  F.   SIGMOID COLON POLYP:  Hyperplastic polyp    JBS/pah    CLINICAL HISTORY:  Pain of upper abdomen, nausea, diarrhea, other constipation    SPECIMENS RECEIVED:  A.  DUODENUM, BIOPSY  B.  ANTRUM, BIOPSY  C.  GE JUNCTION  D.  TERMINAL ILEUM BIOPSY  E.  COLON, BIOPSY  F.  SIGMOID COLON POLYP    MICROSCOPIC DESCRIPTION:  Tissue blocks are prepared and slides are examined  "microscopically on all  specimens. See diagnosis for details.    Professional interpretation rendered by Baldev Beltrán M.D. at [facility],  [place], [address] [place] [address].    GROSS DESCRIPTION:  A.  Labeled as \"duodenum\", consisting of 1 piece of tan soft tissue measuring  0.5 x 0.2 x 0.2 cm, submitted entirely in 1 cassette.  SOG  B.  Labeled as \"gastric antrum\", consisting of 2 pieces of tan soft tissue  measuring 0.4 x 0.2 x 0.2 cm, submitted entirely in  1 cassette.  C.  Labeled as \"GE junction\", consisting of 1 piece of tan soft tissue measuring  0.3 x 0.2 x 0.2 cm, submitted entirely in 1 cassette.  D.  Labeled as \"terminal ileum\", consisting of 2 pieces of tan soft tissue  measuring 0.4 x 0.3 x 0.2 cm, submitted entirely cassette.  E.  Labeled as \"colonic mucosa\", consisting of 2 pieces of tan soft tissue  measuring 0.8 x 0.4 x 0.2 cm, submitted entirely in 1 cassette.  F.  Labeled as \"sigmoid colon polyp\", consisting of 1 piece of tan soft tissue  measuring 0.6 x 0.3 x 0.2 cm, submitted entirely in 1 cassette.    REVIEWED, DIAGNOSED AND ELECTRONICALLY  SIGNED BY:    Baldev Beltrán M.D.  CPT CODES:  88305x6     POC Glucose Once    Specimen: Blood   Result Value Ref Range    Glucose 248 (H) 70 - 130 mg/dL   Duodenal Culture, Quantitative - Body Fluid, Small Intestine, Duodenum    Specimen: Small Intestine, Duodenum; Body Fluid   Result Value Ref Range    Duodenal Culture No growth at 2 days    Results for orders placed or performed in visit on 03/31/23   Hepatic Function Panel    Specimen: Blood   Result Value Ref Range    Total Protein 8.0 6.0 - 8.5 g/dL    Albumin 4.6 3.5 - 5.2 g/dL    ALT (SGPT) 73 (H) 1 - 33 U/L    AST (SGOT) 50 (H) 1 - 32 U/L    Alkaline Phosphatase 69 39 - 117 U/L    Total Bilirubin 0.4 0.0 - 1.2 mg/dL    Bilirubin, Direct <0.2 0.0 - 0.3 mg/dL    Bilirubin, Indirect     Results for orders placed or performed during the hospital encounter of 06/20/18   Tissue " Pathology Exam    Specimen: Tonsils; Tissue   Result Value Ref Range    Case Report       Surgical Pathology Report                         Case: [id number]                                  Authorizing Provider:  Yusuf Espinoza MD Collected:           06/20/2018 09:35 AM          Ordering Location:     [facility]             Received:            06/20/2018 10:07 AM                                 [facility]                                                              Pathologist:           Wayne Aguilera MD                                                         Specimen:    Tonsils, right tagged                                                                      Final Diagnosis       CHRONIC TONSILLITIS (2).      Gross Description       The specimen consists of 2 pieces of tonsils almost equal in size and shape, one measuring 3.0 x 2.5 x 2.5 cm.  Their surfaces show well preserved crypts and focally coated with colorless mucus.  Serial sectioning failed to reveal any significant nodularities.  Representative sections are embedded as follows.  1A right; 1B left.     Pregnancy, Urine - Urine, Clean Catch    Specimen: Urine, Clean Catch   Result Value Ref Range    HCG, Urine QL Negative Negative   POC Glucose Once    Specimen: Blood   Result Value Ref Range    Glucose 119 70 - 130 mg/dL   POC Glucose Once    Specimen: Blood   Result Value Ref Range    Glucose 132 (H) 70 - 130 mg/dL   Results for orders placed or performed in visit on 06/13/18   Basic Metabolic Panel    Specimen: Blood   Result Value Ref Range    Glucose 117 (H) 60 - 100 mg/dL    BUN 11 8 - 21 mg/dL    Creatinine 0.56 0.50 - 1.00 mg/dL    Sodium 140 137 - 145 mmol/L    Potassium 4.7 3.5 - 5.1 mmol/L    Chloride 103 95 - 110 mmol/L    CO2 23.0 22.0 - 31.0 mmol/L    Calcium 9.6 8.4 - 10.2 mg/dL    eGFR  African Amer  71 - 165 mL/min/1.73    eGFR Non  Amer 141 71 - 165 mL/min/1.73    BUN/Creatinine Ratio 19.6 7.0 - 25.0     Anion Gap 14.0 5.0 - 15.0 mmol/L   Results for orders placed or performed during the hospital encounter of 09/04/17   CBC Auto Differential    Specimen: Blood   Result Value Ref Range    WBC 9.95 (H) 3.20 - 9.80 10*3/mm3    RBC 4.56 3.77 - 5.16 10*6/mm3    Hemoglobin 12.1 12.0 - 15.5 g/dL    Hematocrit 36.6 35.0 - 45.0 %    MCV 80.3 80.0 - 98.0 fL    MCH 26.5 26.5 - 34.0 pg    MCHC 33.1 31.4 - 36.0 g/dL    RDW 13.7 11.5 - 14.5 %    RDW-SD 39.4 36.4 - 46.3 fl    MPV 9.7 8.0 - 12.0 fL    Platelets 324 150 - 450 10*3/mm3    Neutrophil % 58.8 37.0 - 80.0 %    Lymphocyte % 33.9 10.0 - 50.0 %    Monocyte % 5.3 0.0 - 12.0 %    Eosinophil % 1.4 0.0 - 7.0 %    Basophil % 0.3 0.0 - 2.0 %    Immature Grans % 0.3 0.0 - 0.5 %    Neutrophils, Absolute 5.85 2.00 - 8.60 10*3/mm3    Lymphocytes, Absolute 3.37 0.60 - 4.20 10*3/mm3    Monocytes, Absolute 0.53 0.00 - 0.90 10*3/mm3    Eosinophils, Absolute 0.14 0.00 - 0.70 10*3/mm3    Basophils, Absolute 0.03 0.00 - 0.20 10*3/mm3    Immature Grans, Absolute 0.03 (H) 0.00 - 0.02 10*3/mm3   Rapid Strep A Screen    Specimen: Throat; Swab   Result Value Ref Range    Strep A Ag Negative Negative   POC Glucose Fingerstick    Specimen: Blood   Result Value Ref Range    Glucose 98 70 - 130 mg/dL   Beta Strep Culture, Throat    Specimen: Throat; Swab   Result Value Ref Range    Throat Culture, Beta Strep No Group A, C, or G Strep Isolated at 48 hours    Results for orders placed or performed in visit on 06/03/05   Converted Surgical Pathology    Specimen: Tissue   Result Value Ref Range    Spec Descr 1 SPECIMEN(S): A ADENOIDS     Clinical Information   CLINICAL HISTORY:  Chronic adenoiditis       Gross Description         GROSS DESCRIPTION:  The specimen consists of fragments of adenoidal tissue measuring from  1.0-2.0 cm in greatest dimension.  No sections.      Final Diagnosis         FINAL DIAGNOSIS:  CHRONIC ADENOIDITIS.                            [facility] [place]AGNOSIS CODE:  6      Comment    CLINICAL DIAGNOSIS:  Adenoids       CONVERTED (HISTORICAL) FINAL PATHOLOGIST       Diagnostician:  ALY MADDOX M.D.  Pathologist  Electronically Signed 06/06/2005           This document has been electronically signed by Emerita Harper MD on September 30, 2023 09:05 CDT

## (undated) DEVICE — DEFOGGER!" ANTI FOG KIT: Brand: DEROYAL

## (undated) DEVICE — GLV SURG TRIUMPH ORTHO W/ALOE PF LTX 6.5 STRL

## (undated) DEVICE — STERILE POLYISOPRENE POWDER-FREE SURGICAL GLOVES WITH EMOLLIENT COATING: Brand: PROTEXIS

## (undated) DEVICE — ELECTRD BLD EZ CLN MOD 2.5IN

## (undated) DEVICE — BITEBLOCK ENDO W/STRAP 60F A/ LF DISP

## (undated) DEVICE — MSK ENDO PORT O2 POM ELITE CURAPLEX A/

## (undated) DEVICE — Device: Brand: DISPOSABLE ELECTROSURGICAL SNARE

## (undated) DEVICE — TRAP,MUCUS SPECIMEN,40CC: Brand: MEDLINE

## (undated) DEVICE — MAD T & A: Brand: MEDLINE INDUSTRIES, INC.

## (undated) DEVICE — SUCTION COAGULATOR, 1 PER POUCH: Brand: A&E MEDICAL / DISPOSABLE SUCTION COAGULATOR

## (undated) DEVICE — GLV SURG TRIUMPH LT PF LTX 8 STRL

## (undated) DEVICE — SINGLE-USE BIOPSY FORCEPS: Brand: RADIAL JAW 4

## (undated) DEVICE — SOL IRR NACL 0.9PCT BT 1000ML

## (undated) DEVICE — TRAP SXN POLYP QUICKCATCH LF